# Patient Record
Sex: FEMALE | Race: WHITE | Employment: FULL TIME | URBAN - METROPOLITAN AREA
[De-identification: names, ages, dates, MRNs, and addresses within clinical notes are randomized per-mention and may not be internally consistent; named-entity substitution may affect disease eponyms.]

---

## 2017-07-27 PROBLEM — I10 ESSENTIAL HYPERTENSION: Status: ACTIVE | Noted: 2017-07-27

## 2018-09-14 ENCOUNTER — HOSPITAL ENCOUNTER (OUTPATIENT)
Dept: SLEEP CENTER | Age: 53
Discharge: HOME | End: 2018-09-14
Payer: COMMERCIAL

## 2018-09-14 DIAGNOSIS — G47.33 OSA (OBSTRUCTIVE SLEEP APNEA): Primary | ICD-10-CM

## 2018-09-14 PROCEDURE — 95810 POLYSOM 6/> YRS 4/> PARAM: CPT

## 2018-11-24 ENCOUNTER — HOSPITAL ENCOUNTER (OUTPATIENT)
Dept: SLEEP CENTER | Age: 53
Discharge: HOME | End: 2018-11-24
Payer: COMMERCIAL

## 2018-11-24 DIAGNOSIS — G47.33 OSA (OBSTRUCTIVE SLEEP APNEA): Primary | ICD-10-CM

## 2018-11-24 PROCEDURE — 95811 POLYSOM 6/>YRS CPAP 4/> PARM: CPT

## 2018-12-03 NOTE — PROGRESS NOTES
[address]                               SLEEP STUDY REPORT    PATIENT NAME: Chas Person                  :        1965  MED REC NO:   [id number]                            ROOM:  ACCOUNT NO:   [de-identified]                           ADMIT DATE: 2018  PROVIDER:     Santa Rose MD    DATE OF STUDY:  2018    ATTENDING:  Javed Fernandez MD    SLEEP SPECIALIST:  Santa Rose MD    INDICATION:  CPAP titration. SLEEP ARCHITECTURE:   minutes,  minutes, sleep efficiency  of 73% which is moderately decreased. SLEEP STAGES:  N1 5.5%, N2 50%, N3 22%, REM 21%. SLEEP LATENCY:  N1 0 minutes, N2 2.5 minutes, N3 54 minutes, .5  minutes. VENTILATION SUMMARY:  With an apnea plus hypopnea index of 3. PLMS SUMMARY WITH AROUSAL:  With an index of 0.8. OXYGENATION SUMMARY:  Mean 92%. HEART RATE SUMMARY:  Mean 66 beats per minute. No evidence of ramez or  tachyarrhythmias. CONCLUSION:  This titration was performed as per the [address] [place] [address]. The best results were obtained with  a CPAP system of 11 cm of water and an EPR of 3. The mask used was a  ResMed AirFit N20, size small. Heated humidity and ramping of the  pressure will improve compliance. In addition, of course, weight  control is essential and avoidance of sedatives, narcotics, hypnotics,  or any other substance that could the apnea recommended.     Thank you kindly,        Wilma Butler MD    D: 2018 10:24:08       T: 2018 3:34:38     [id number]  Job#: [id number]     Doc#: [id number]    CC:

## 2019-01-01 ENCOUNTER — HOSPITAL ENCOUNTER (EMERGENCY)
Age: 54
Discharge: HOME | End: 2019-01-01
Payer: COMMERCIAL

## 2019-01-01 VITALS
HEART RATE: 78 BPM | TEMPERATURE: 97.4 F | DIASTOLIC BLOOD PRESSURE: 89 MMHG | RESPIRATION RATE: 16 BRPM | SYSTOLIC BLOOD PRESSURE: 168 MMHG | OXYGEN SATURATION: 97 % | BODY MASS INDEX: 25.1 KG/M2 | WEIGHT: 170 LBS

## 2019-01-01 DIAGNOSIS — N30.01 ACUTE CYSTITIS WITH HEMATURIA: Primary | ICD-10-CM

## 2019-01-01 LAB
BACTERIA: ABNORMAL /HPF
BILIRUBIN URINE: NEGATIVE
BLOOD, URINE: ABNORMAL
CLARITY: CLEAR
COLOR: YELLOW
GLUCOSE URINE: NEGATIVE MG/DL
KETONES, URINE: NEGATIVE MG/DL
LEUKOCYTE ESTERASE, URINE: ABNORMAL
NITRITE, URINE: NEGATIVE
PH UA: 8.5 (ref 5–9)
PROTEIN UA: NEGATIVE MG/DL
RBC UA: ABNORMAL /HPF (ref 0–2)
SPECIFIC GRAVITY UA: 1.01 (ref 1–1.03)
UROBILINOGEN, URINE: 0.2 E.U./DL
WBC UA: ABNORMAL /HPF (ref 0–5)

## 2019-01-01 PROCEDURE — 81001 URINALYSIS AUTO W/SCOPE: CPT

## 2019-01-01 PROCEDURE — 87186 SC STD MICRODIL/AGAR DIL: CPT

## 2019-01-01 PROCEDURE — 87077 CULTURE AEROBIC IDENTIFY: CPT

## 2019-01-01 PROCEDURE — 87088 URINE BACTERIA CULTURE: CPT

## 2019-01-01 PROCEDURE — 99212 OFFICE O/P EST SF 10 MIN: CPT

## 2019-01-01 RX ORDER — CEPHALEXIN 500 MG/1
500 CAPSULE ORAL 3 TIMES DAILY
Qty: 21 CAPSULE | Refills: 0 | Status: SHIPPED | OUTPATIENT
Start: 2019-01-01 | End: 2019-01-08

## 2019-01-01 RX ORDER — PHENAZOPYRIDINE HYDROCHLORIDE 200 MG/1
200 TABLET, FILM COATED ORAL 3 TIMES DAILY PRN
Qty: 6 TABLET | Refills: 0 | Status: SHIPPED | OUTPATIENT
Start: 2019-01-01 | End: 2019-01-03

## 2019-01-04 LAB
ORGANISM: ABNORMAL
URINE CULTURE, ROUTINE: ABNORMAL
URINE CULTURE, ROUTINE: ABNORMAL

## 2019-07-11 ENCOUNTER — HOSPITAL ENCOUNTER (OUTPATIENT)
Age: 54
Discharge: HOME | End: 2019-07-11
Payer: COMMERCIAL

## 2019-07-11 PROCEDURE — 93005 ELECTROCARDIOGRAM TRACING: CPT | Performed by: INTERNAL MEDICINE

## 2019-07-12 LAB
EKG ATRIAL RATE: 74 BPM
EKG P AXIS: 54 DEGREES
EKG P-R INTERVAL: 174 MS
EKG Q-T INTERVAL: 414 MS
EKG QRS DURATION: 80 MS
EKG QTC CALCULATION (BAZETT): 459 MS
EKG R AXIS: 27 DEGREES
EKG T AXIS: 49 DEGREES
EKG VENTRICULAR RATE: 74 BPM

## 2022-06-24 DIAGNOSIS — Z00.00 WELL ADULT EXAM: ICD-10-CM

## 2022-06-24 DIAGNOSIS — E78.01 FAMILIAL HYPERCHOLESTEROLEMIA: ICD-10-CM

## 2022-06-24 DIAGNOSIS — D50.0 IRON DEFICIENCY ANEMIA SECONDARY TO BLOOD LOSS (CHRONIC): ICD-10-CM

## 2022-06-24 DIAGNOSIS — E03.9 PRIMARY HYPOTHYROIDISM: ICD-10-CM

## 2022-06-24 DIAGNOSIS — R73.9 HYPERGLYCEMIA: ICD-10-CM

## 2022-06-24 LAB
ALBUMIN SERPL-MCNC: 4.4 G/DL (ref 3.5–5.2)
ALP BLD-CCNC: 94 U/L (ref 35–104)
ALT SERPL-CCNC: 15 U/L (ref 0–32)
ANION GAP SERPL CALCULATED.3IONS-SCNC: 15 MMOL/L (ref 7–16)
AST SERPL-CCNC: 22 U/L (ref 0–31)
BILIRUB SERPL-MCNC: 1.5 MG/DL (ref 0–1.2)
BUN BLDV-MCNC: 12 MG/DL (ref 6–20)
CALCIUM SERPL-MCNC: 9.3 MG/DL (ref 8.6–10.2)
CHLORIDE BLD-SCNC: 101 MMOL/L (ref 98–107)
CHOLESTEROL, TOTAL: 145 MG/DL (ref 0–199)
CO2: 22 MMOL/L (ref 22–29)
CREAT SERPL-MCNC: 0.6 MG/DL (ref 0.5–1)
GFR AFRICAN AMERICAN: >60
GFR NON-AFRICAN AMERICAN: >60 ML/MIN/1.73
GLUCOSE BLD-MCNC: 84 MG/DL (ref 74–99)
HBA1C MFR BLD: 5.1 % (ref 4–5.6)
HCT VFR BLD CALC: 38.6 % (ref 34–48)
HDLC SERPL-MCNC: 81 MG/DL
HEMOGLOBIN: 12.6 G/DL (ref 11.5–15.5)
LDL CHOLESTEROL CALCULATED: 59 MG/DL (ref 0–99)
MCH RBC QN AUTO: 30.8 PG (ref 26–35)
MCHC RBC AUTO-ENTMCNC: 32.6 % (ref 32–34.5)
MCV RBC AUTO: 94.4 FL (ref 80–99.9)
PDW BLD-RTO: 13.8 FL (ref 11.5–15)
PLATELET # BLD: 327 E9/L (ref 130–450)
PMV BLD AUTO: 8.8 FL (ref 7–12)
POTASSIUM SERPL-SCNC: 4.2 MMOL/L (ref 3.5–5)
RBC # BLD: 4.09 E12/L (ref 3.5–5.5)
SODIUM BLD-SCNC: 138 MMOL/L (ref 132–146)
TOTAL PROTEIN: 7.2 G/DL (ref 6.4–8.3)
TRIGL SERPL-MCNC: 26 MG/DL (ref 0–149)
TSH SERPL DL<=0.05 MIU/L-ACNC: 2.69 UIU/ML (ref 0.27–4.2)
VLDLC SERPL CALC-MCNC: 5 MG/DL
WBC # BLD: 2.9 E9/L (ref 4.5–11.5)

## 2024-01-11 ENCOUNTER — HOSPITAL ENCOUNTER (EMERGENCY)
Age: 59
Discharge: HOME | End: 2024-01-11
Payer: COMMERCIAL

## 2024-01-11 ENCOUNTER — APPOINTMENT (OUTPATIENT)
Dept: GENERAL RADIOLOGY | Age: 59
End: 2024-01-11
Payer: COMMERCIAL

## 2024-01-11 VITALS
WEIGHT: 185 LBS | RESPIRATION RATE: 18 BRPM | OXYGEN SATURATION: 100 % | DIASTOLIC BLOOD PRESSURE: 90 MMHG | BODY MASS INDEX: 26.93 KG/M2 | HEART RATE: 81 BPM | SYSTOLIC BLOOD PRESSURE: 136 MMHG | TEMPERATURE: 98.7 F

## 2024-01-11 DIAGNOSIS — S46.912A LEFT SHOULDER STRAIN, INITIAL ENCOUNTER: ICD-10-CM

## 2024-01-11 DIAGNOSIS — T07.XXXA MULTIPLE ABRASIONS: ICD-10-CM

## 2024-01-11 DIAGNOSIS — S60.222A CONTUSION OF LEFT HAND, INITIAL ENCOUNTER: Primary | ICD-10-CM

## 2024-01-11 PROCEDURE — 73130 X-RAY EXAM OF HAND: CPT

## 2024-01-11 PROCEDURE — 73030 X-RAY EXAM OF SHOULDER: CPT

## 2024-01-11 PROCEDURE — 99211 OFF/OP EST MAY X REQ PHY/QHP: CPT

## 2024-01-11 ASSESSMENT — PAIN SCALES - GENERAL: PAINLEVEL_OUTOF10: 7

## 2024-01-11 ASSESSMENT — PAIN DESCRIPTION - ORIENTATION: ORIENTATION: LEFT

## 2024-01-11 ASSESSMENT — PAIN DESCRIPTION - DESCRIPTORS: DESCRIPTORS: ACHING;DISCOMFORT

## 2024-01-11 ASSESSMENT — PAIN DESCRIPTION - LOCATION: LOCATION: HAND;SHOULDER

## 2024-01-11 ASSESSMENT — PAIN - FUNCTIONAL ASSESSMENT: PAIN_FUNCTIONAL_ASSESSMENT: 0-10

## 2024-01-11 NOTE — ED PROVIDER NOTES
and left shoulder injury primarily.  X-ray of the left shoulder and left hand were negative for fracture or dislocation she does have some small abrasions.  Recommend symptomatic treatment.  May return back to work without restriction.  Follow-up with occupational health.  Instructions given.         DISCHARGE MEDICATIONS:  New Prescriptions    No medications on file       DISCONTINUED MEDICATIONS:  Discontinued Medications    No medications on file       PATIENT REFERRED TO:  [facility]  [address] [place] [address]  [phone]  Schedule an appointment as soon as possible for a visit         --------------------------------- ADDITIONAL PROVIDER NOTES ---------------------------------  I have spoken with the patient and discussed today’s results, in addition to providing specific details for the plan of care and counseling regarding the diagnosis and prognosis.  Their questions are answered at this time and they are agreeable with the plan.   This patient is stable for discharge.  I have shared the specific conditions for return, as well as the importance of follow-up.      * NOTE: (Please note that portions of this note were completed with a voice recognition program.  Efforts were made to edit the dictations but occasionally words are mis-transcribed.)    --------------------------------- IMPRESSION AND DISPOSITION ---------------------------------    IMPRESSION  1. Contusion of left hand, initial encounter    2. Left shoulder strain, initial encounter    3. Multiple abrasions        DISPOSITION Decision To Discharge 01/11/2024 11:10:05 AM    Disposition: Discharge to home  Patient condition is good    I am the Primary Clinician of Record.     Nithin Andrews PA-C (electronically signed) on 1/11/2024 at 11:10 AM         Nithin Andrews PA-C  01/11/24 111

## 2024-03-26 ENCOUNTER — OFFICE VISIT (OUTPATIENT)
Dept: ENT CLINIC | Age: 59
End: 2024-03-26
Payer: COMMERCIAL

## 2024-03-26 ENCOUNTER — PROCEDURE VISIT (OUTPATIENT)
Dept: AUDIOLOGY | Age: 59
End: 2024-03-26
Payer: COMMERCIAL

## 2024-03-26 VITALS
WEIGHT: 185 LBS | HEART RATE: 85 BPM | SYSTOLIC BLOOD PRESSURE: 149 MMHG | DIASTOLIC BLOOD PRESSURE: 95 MMHG | BODY MASS INDEX: 27.32 KG/M2

## 2024-03-26 DIAGNOSIS — H90.41 SENSORINEURAL HEARING LOSS, UNILATERAL, RIGHT EAR, WITH UNRESTRICTED HEARING ON THE CONTRALATERAL SIDE: Primary | ICD-10-CM

## 2024-03-26 DIAGNOSIS — H90.41 SENSORINEURAL HEARING LOSS (SNHL) OF RIGHT EAR WITH UNRESTRICTED HEARING OF LEFT EAR: Primary | ICD-10-CM

## 2024-03-26 PROCEDURE — G8419 CALC BMI OUT NRM PARAM NOF/U: HCPCS | Performed by: OTOLARYNGOLOGY

## 2024-03-26 PROCEDURE — 3017F COLORECTAL CA SCREEN DOC REV: CPT | Performed by: OTOLARYNGOLOGY

## 2024-03-26 PROCEDURE — 1036F TOBACCO NON-USER: CPT | Performed by: OTOLARYNGOLOGY

## 2024-03-26 PROCEDURE — 99203 OFFICE O/P NEW LOW 30 MIN: CPT | Performed by: OTOLARYNGOLOGY

## 2024-03-26 PROCEDURE — G8427 DOCREV CUR MEDS BY ELIG CLIN: HCPCS | Performed by: OTOLARYNGOLOGY

## 2024-03-26 PROCEDURE — 92567 TYMPANOMETRY: CPT | Performed by: AUDIOLOGIST

## 2024-03-26 PROCEDURE — 3077F SYST BP >= 140 MM HG: CPT | Performed by: OTOLARYNGOLOGY

## 2024-03-26 PROCEDURE — 92557 COMPREHENSIVE HEARING TEST: CPT | Performed by: AUDIOLOGIST

## 2024-03-26 PROCEDURE — G8484 FLU IMMUNIZE NO ADMIN: HCPCS | Performed by: OTOLARYNGOLOGY

## 2024-03-26 PROCEDURE — 3080F DIAST BP >= 90 MM HG: CPT | Performed by: OTOLARYNGOLOGY

## 2024-03-26 ASSESSMENT — ENCOUNTER SYMPTOMS
SINUS PAIN: 0
VOMITING: 0
SINUS PRESSURE: 0
VOICE CHANGE: 0
SHORTNESS OF BREATH: 0
TROUBLE SWALLOWING: 0
COUGH: 0
RHINORRHEA: 0

## 2024-03-26 NOTE — PROGRESS NOTES
[facility]  ELIAN MarshallO. Ms.Ed.  New Consult       Patient Name:  Natasha Washburn  :  1965     CHIEF C/O:    Chief Complaint   Patient presents with   • New Patient     PT STATES MID JANUARY SHE HAD FLUID IN RIGHT EAR ALONG WITH RINGING. PT STATES RINGING STILL COMES AND GOES.        HISTORY OBTAINED FROM:  patient    HISTORY OF PRESENT ILLNESS:       Natasha is a 59 y.o. year old female, here today for:       Hearing loss in right ear since child bal and has no vertigo increased changes in her hearing loss.  She she states that she has had known weakness in the right ear since childhood, as previously stated but has been noticing increased difficulties with in classroom hearing.  She is as 1/5  is having problems at work, she denies any other complaints of left-sided ear pain or hearing loss.  A full audiogram was conducted and reviewed with the patient today.  No difficulty swallowing no fever chills no shortness of breath or stridor no recent ear infections.         Past Medical History:   Diagnosis Date   • Anxiety    • Hypertension    • Nondependent alcohol abuse      Past Surgical History:   Procedure Laterality Date   • BACK SURGERY     • BREAST SURGERY         Current Outpatient Medications:   •  azelastine (ASTELIN) 0.1 % nasal spray, 1 spray by Nasal route 2 times daily Use in each nostril as directed, Disp: 60 mL, Rfl: 1  •  losartan (COZAAR) 50 MG tablet, , Disp: , Rfl:   •  amLODIPine (NORVASC) 5 MG tablet, Take 1 tablet by mouth daily, Disp: , Rfl:   •  estrogens conjugated (PREMARIN) 0.625 MG/GM CREA vaginal cream, Place 0.5 g vaginally daily, Disp: , Rfl:   •  citalopram (CELEXA) 10 MG tablet, Take 1 tablet by mouth daily, Disp: 90 tablet, Rfl: 1  •  citalopram (CELEXA) 20 MG tablet, Take 1 tablet by mouth daily, Disp: 90 tablet, Rfl: 1  Erythromycin and Sulfa antibiotics  Social History     Tobacco Use   • Smoking status: Never   • Smokeless tobacco:

## 2024-03-28 NOTE — PROGRESS NOTES
This patient was referred for audiometric and tympanometric testing by Dr. Atkins.  HISTORY OF PRESENT ILLNESS:       Natasha is a 59 y.o. year old female, here today for:       Hearing loss in right ear since child bal and has no vertigo increased changes in her hearing loss.  She she states that she has had known weakness in the right ear since childhood, as previously stated but has been noticing increased difficulties with in classroom hearing.  She is as 1/5  is having problems at work, she denies any other complaints of left-sided ear pain or hearing loss.  A full audiogram was conducted and reviewed with the patient today.  No difficulty swallowing no fever chills no shortness of breath or stridor no recent ear infections.       Audiometry using pure tone air and bone conduction testing revealed normal-to-borderline-normal hearing sensitivity, through the frequency range, left ear and a mild-to-moderate sensorineural hearing loss, right ear.  . Reliability was good. Speech reception thresholds were in good agreement with the pure tone averages, bilaterally. Speech discrimination scores were 88%, right ear at 65dBHL and 96%, left ear at 45dBHL.    Tympanometry revealed normal middle ear peak pressure and compliance, bilaterally.  Ipsilateral acoustic reflexes were present, right ear and absent, left ear at 1000Hz.    The results were reviewed with the patient.     Recommendations for follow up will be made pending physician consult.    Mario Esteban CCC/OLIVIA  Audiologist  [id number]  NPI#:  [id number]      Electronically signed by Maryjo Acevedo on 3/28/2024 at 3:32 PM

## 2024-07-01 DIAGNOSIS — E78.01 FAMILIAL HYPERCHOLESTEROLEMIA: ICD-10-CM

## 2024-07-01 DIAGNOSIS — D50.0 IRON DEFICIENCY ANEMIA SECONDARY TO BLOOD LOSS (CHRONIC): ICD-10-CM

## 2024-07-01 DIAGNOSIS — R73.9 HYPERGLYCEMIA: ICD-10-CM

## 2024-07-01 LAB
ALBUMIN: 4.5 G/DL (ref 3.5–5.2)
ALP BLD-CCNC: 79 U/L (ref 35–104)
ALT SERPL-CCNC: 14 U/L (ref 0–32)
ANION GAP SERPL CALCULATED.3IONS-SCNC: 16 MMOL/L (ref 7–16)
AST SERPL-CCNC: 30 U/L (ref 0–31)
BILIRUB SERPL-MCNC: 1 MG/DL (ref 0–1.2)
BILIRUB UR QL STRIP: NEGATIVE
BUN BLDV-MCNC: 15 MG/DL (ref 6–20)
CALCIUM SERPL-MCNC: 9.5 MG/DL (ref 8.6–10.2)
CHLORIDE BLD-SCNC: 102 MMOL/L (ref 98–107)
CHOLESTEROL, TOTAL: 161 MG/DL
CLARITY UR: CLEAR
CO2: 24 MMOL/L (ref 22–29)
COLOR UR: YELLOW
CREAT SERPL-MCNC: 0.7 MG/DL (ref 0.5–1)
CREAT UR-MCNC: 13.1 MG/DL (ref 29–226)
GFR, ESTIMATED: >90 ML/MIN/1.73M2
GLUCOSE BLD-MCNC: 89 MG/DL (ref 74–99)
GLUCOSE UR STRIP-MCNC: NEGATIVE MG/DL
HBA1C MFR BLD: 5.3 % (ref 4–5.6)
HCT VFR BLD CALC: 41.2 % (ref 34–48)
HDLC SERPL-MCNC: 82 MG/DL
HEMOGLOBIN: 13.2 G/DL (ref 11.5–15.5)
HGB UR QL STRIP.AUTO: NEGATIVE
KETONES UR STRIP-MCNC: NEGATIVE MG/DL
LDL CHOLESTEROL: 73 MG/DL
LEUKOCYTE ESTERASE UR QL STRIP: NEGATIVE
MCH RBC QN AUTO: 30.1 PG (ref 26–35)
MCHC RBC AUTO-ENTMCNC: 32 G/DL (ref 32–34.5)
MCV RBC AUTO: 94.1 FL (ref 80–99.9)
MICROALBUMIN UR-MCNC: <12 MG/L (ref 0–19)
MICROALBUMIN/CREAT UR-RTO: ABNORMAL MCG/MG CREAT (ref 0–30)
NITRITE UR QL STRIP: NEGATIVE
PDW BLD-RTO: 13.3 % (ref 11.5–15)
PH UR STRIP: 7 [PH] (ref 5–9)
PLATELET # BLD: 333 K/UL (ref 130–450)
PMV BLD AUTO: 9 FL (ref 7–12)
POTASSIUM SERPL-SCNC: 4.6 MMOL/L (ref 3.5–5)
PROT UR STRIP-MCNC: NEGATIVE MG/DL
RBC # BLD: 4.38 M/UL (ref 3.5–5.5)
RBC #/AREA URNS HPF: ABNORMAL /HPF
SODIUM BLD-SCNC: 142 MMOL/L (ref 132–146)
SP GR UR STRIP: <1.005 (ref 1–1.03)
TOTAL PROTEIN: 7.4 G/DL (ref 6.4–8.3)
TRIGL SERPL-MCNC: 30 MG/DL
UROBILINOGEN UR STRIP-ACNC: 0.2 EU/DL (ref 0–1)
VLDLC SERPL CALC-MCNC: 6 MG/DL
WBC # BLD: 3.7 K/UL (ref 4.5–11.5)
WBC #/AREA URNS HPF: ABNORMAL /HPF

## 2024-07-03 ENCOUNTER — TRANSCRIBE ORDERS (OUTPATIENT)
Dept: ADMINISTRATIVE | Age: 59
End: 2024-07-03

## 2024-07-03 DIAGNOSIS — N28.1 ACQUIRED CYST OF KIDNEY: Primary | ICD-10-CM

## 2024-11-19 ENCOUNTER — APPOINTMENT (OUTPATIENT)
Dept: GENERAL RADIOLOGY | Age: 59
DRG: 481 | End: 2024-11-19
Payer: COMMERCIAL

## 2024-11-19 ENCOUNTER — HOSPITAL ENCOUNTER (INPATIENT)
Age: 59
LOS: 4 days | Discharge: HOME HEALTH | DRG: 481 | End: 2024-11-23
Attending: EMERGENCY MEDICINE | Admitting: INTERNAL MEDICINE
Payer: COMMERCIAL

## 2024-11-19 DIAGNOSIS — S72.001A CLOSED FRACTURE OF RIGHT HIP, INITIAL ENCOUNTER (HCC): Primary | ICD-10-CM

## 2024-11-19 DIAGNOSIS — S72.142A INTERTROCHANTERIC FRACTURE OF LEFT HIP, CLOSED, INITIAL ENCOUNTER (HCC): ICD-10-CM

## 2024-11-19 LAB
ABO + RH BLD: NORMAL
ALBUMIN SERPL-MCNC: 4.1 G/DL (ref 3.5–5.2)
ALP SERPL-CCNC: 75 U/L (ref 35–104)
ALT SERPL-CCNC: 20 U/L (ref 0–32)
ANION GAP SERPL CALCULATED.3IONS-SCNC: 12 MMOL/L (ref 7–16)
ARM BAND NUMBER: NORMAL
AST SERPL-CCNC: 28 U/L (ref 0–31)
BASOPHILS # BLD: 0 K/UL (ref 0–0.2)
BASOPHILS NFR BLD: 0 % (ref 0–2)
BILIRUB SERPL-MCNC: 1.4 MG/DL (ref 0–1.2)
BLOOD BANK SAMPLE EXPIRATION: NORMAL
BLOOD GROUP ANTIBODIES SERPL: NEGATIVE
BUN SERPL-MCNC: 11 MG/DL (ref 6–20)
CALCIUM SERPL-MCNC: 8.8 MG/DL (ref 8.6–10.2)
CHLORIDE SERPL-SCNC: 98 MMOL/L (ref 98–107)
CO2 SERPL-SCNC: 25 MMOL/L (ref 22–29)
CREAT SERPL-MCNC: 0.5 MG/DL (ref 0.5–1)
EOSINOPHIL # BLD: 0 K/UL (ref 0.05–0.5)
EOSINOPHILS RELATIVE PERCENT: 0 % (ref 0–6)
ERYTHROCYTE [DISTWIDTH] IN BLOOD BY AUTOMATED COUNT: 13.1 % (ref 11.5–15)
GFR, ESTIMATED: >90 ML/MIN/1.73M2
GLUCOSE SERPL-MCNC: 168 MG/DL (ref 74–99)
HCT VFR BLD AUTO: 35.5 % (ref 34–48)
HGB BLD-MCNC: 12.1 G/DL (ref 11.5–15.5)
LYMPHOCYTES NFR BLD: 0.46 K/UL (ref 1.5–4)
LYMPHOCYTES RELATIVE PERCENT: 7 % (ref 20–42)
MCH RBC QN AUTO: 31.3 PG (ref 26–35)
MCHC RBC AUTO-ENTMCNC: 34.1 G/DL (ref 32–34.5)
MCV RBC AUTO: 91.7 FL (ref 80–99.9)
MONOCYTES NFR BLD: 0.39 K/UL (ref 0.1–0.95)
MONOCYTES NFR BLD: 6 % (ref 2–12)
NEUTROPHILS NFR BLD: 87 % (ref 43–80)
NEUTS SEG NFR BLD: 5.66 K/UL (ref 1.8–7.3)
PLATELET # BLD AUTO: 295 K/UL (ref 130–450)
PMV BLD AUTO: 9 FL (ref 7–12)
POTASSIUM SERPL-SCNC: 3.7 MMOL/L (ref 3.5–5)
PROT SERPL-MCNC: 6.6 G/DL (ref 6.4–8.3)
RBC # BLD AUTO: 3.87 M/UL (ref 3.5–5.5)
RBC # BLD: ABNORMAL 10*6/UL
RBC # BLD: ABNORMAL 10*6/UL
SODIUM SERPL-SCNC: 135 MMOL/L (ref 132–146)
WBC OTHER # BLD: 6.5 K/UL (ref 4.5–11.5)

## 2024-11-19 PROCEDURE — 36415 COLL VENOUS BLD VENIPUNCTURE: CPT

## 2024-11-19 PROCEDURE — 86901 BLOOD TYPING SEROLOGIC RH(D): CPT

## 2024-11-19 PROCEDURE — 73552 X-RAY EXAM OF FEMUR 2/>: CPT

## 2024-11-19 PROCEDURE — 86850 RBC ANTIBODY SCREEN: CPT

## 2024-11-19 PROCEDURE — 71045 X-RAY EXAM CHEST 1 VIEW: CPT

## 2024-11-19 PROCEDURE — 1200000000 HC SEMI PRIVATE

## 2024-11-19 PROCEDURE — 6370000000 HC RX 637 (ALT 250 FOR IP): Performed by: INTERNAL MEDICINE

## 2024-11-19 PROCEDURE — 6360000002 HC RX W HCPCS: Performed by: EMERGENCY MEDICINE

## 2024-11-19 PROCEDURE — 99285 EMERGENCY DEPT VISIT HI MDM: CPT

## 2024-11-19 PROCEDURE — 86900 BLOOD TYPING SEROLOGIC ABO: CPT

## 2024-11-19 PROCEDURE — 6360000002 HC RX W HCPCS: Performed by: INTERNAL MEDICINE

## 2024-11-19 PROCEDURE — 80053 COMPREHEN METABOLIC PANEL: CPT

## 2024-11-19 PROCEDURE — 96376 TX/PRO/DX INJ SAME DRUG ADON: CPT

## 2024-11-19 PROCEDURE — 82306 VITAMIN D 25 HYDROXY: CPT

## 2024-11-19 PROCEDURE — 73501 X-RAY EXAM HIP UNI 1 VIEW: CPT

## 2024-11-19 PROCEDURE — 96374 THER/PROPH/DIAG INJ IV PUSH: CPT

## 2024-11-19 PROCEDURE — 73502 X-RAY EXAM HIP UNI 2-3 VIEWS: CPT

## 2024-11-19 PROCEDURE — 85025 COMPLETE CBC W/AUTO DIFF WBC: CPT

## 2024-11-19 RX ORDER — LOSARTAN POTASSIUM 50 MG/1
50 TABLET ORAL DAILY
Status: DISCONTINUED | OUTPATIENT
Start: 2024-11-20 | End: 2024-11-23 | Stop reason: HOSPADM

## 2024-11-19 RX ORDER — ACETAMINOPHEN 500 MG
500 TABLET ORAL EVERY 6 HOURS PRN
Status: DISCONTINUED | OUTPATIENT
Start: 2024-11-19 | End: 2024-11-23 | Stop reason: HOSPADM

## 2024-11-19 RX ORDER — AMLODIPINE BESYLATE 5 MG/1
5 TABLET ORAL DAILY
Status: DISCONTINUED | OUTPATIENT
Start: 2024-11-20 | End: 2024-11-23 | Stop reason: HOSPADM

## 2024-11-19 RX ORDER — TRANEXAMIC ACID 10 MG/ML
1000 INJECTION, SOLUTION INTRAVENOUS
Status: ACTIVE | OUTPATIENT
Start: 2024-11-20 | End: 2024-11-20

## 2024-11-19 RX ORDER — ENOXAPARIN SODIUM 100 MG/ML
40 INJECTION SUBCUTANEOUS DAILY
Status: DISCONTINUED | OUTPATIENT
Start: 2024-11-20 | End: 2024-11-23 | Stop reason: HOSPADM

## 2024-11-19 RX ORDER — OXYCODONE AND ACETAMINOPHEN 5; 325 MG/1; MG/1
1 TABLET ORAL EVERY 4 HOURS PRN
Status: DISCONTINUED | OUTPATIENT
Start: 2024-11-19 | End: 2024-11-23 | Stop reason: HOSPADM

## 2024-11-19 RX ORDER — FENTANYL CITRATE 50 UG/ML
100 INJECTION, SOLUTION INTRAMUSCULAR; INTRAVENOUS
Status: DISCONTINUED | OUTPATIENT
Start: 2024-11-19 | End: 2024-11-19

## 2024-11-19 RX ORDER — POLYETHYLENE GLYCOL 3350 17 G/17G
17 POWDER, FOR SOLUTION ORAL DAILY PRN
Status: DISCONTINUED | OUTPATIENT
Start: 2024-11-19 | End: 2024-11-23 | Stop reason: HOSPADM

## 2024-11-19 RX ORDER — CITALOPRAM HYDROBROMIDE 10 MG/1
30 TABLET ORAL DAILY
Status: DISCONTINUED | OUTPATIENT
Start: 2024-11-20 | End: 2024-11-23 | Stop reason: HOSPADM

## 2024-11-19 RX ORDER — FENTANYL CITRATE 0.05 MG/ML
50 INJECTION, SOLUTION INTRAMUSCULAR; INTRAVENOUS EVERY 4 HOURS PRN
Status: DISCONTINUED | OUTPATIENT
Start: 2024-11-19 | End: 2024-11-23 | Stop reason: HOSPADM

## 2024-11-19 RX ORDER — FENTANYL CITRATE 50 UG/ML
100 INJECTION, SOLUTION INTRAMUSCULAR; INTRAVENOUS
Status: DISCONTINUED | OUTPATIENT
Start: 2024-11-19 | End: 2024-11-21 | Stop reason: SDUPTHER

## 2024-11-19 RX ORDER — PROCHLORPERAZINE EDISYLATE 5 MG/ML
10 INJECTION INTRAMUSCULAR; INTRAVENOUS EVERY 6 HOURS PRN
Status: DISCONTINUED | OUTPATIENT
Start: 2024-11-19 | End: 2024-11-23 | Stop reason: HOSPADM

## 2024-11-19 RX ORDER — CITALOPRAM HYDROBROMIDE 20 MG/1
20 TABLET ORAL DAILY
Status: DISCONTINUED | OUTPATIENT
Start: 2024-11-19 | End: 2024-11-19 | Stop reason: SDUPTHER

## 2024-11-19 RX ADMIN — FENTANYL CITRATE 50 MCG: 0.05 INJECTION, SOLUTION INTRAMUSCULAR; INTRAVENOUS at 23:37

## 2024-11-19 RX ADMIN — OXYCODONE HYDROCHLORIDE AND ACETAMINOPHEN 1 TABLET: 5; 325 TABLET ORAL at 21:49

## 2024-11-19 RX ADMIN — FENTANYL CITRATE 100 MCG: 50 INJECTION INTRAMUSCULAR; INTRAVENOUS at 14:35

## 2024-11-19 RX ADMIN — FENTANYL CITRATE 100 MCG: 50 INJECTION INTRAMUSCULAR; INTRAVENOUS at 11:10

## 2024-11-19 RX ADMIN — OXYCODONE HYDROCHLORIDE AND ACETAMINOPHEN 1 TABLET: 5; 325 TABLET ORAL at 17:39

## 2024-11-19 RX ADMIN — FENTANYL CITRATE 50 MCG: 0.05 INJECTION, SOLUTION INTRAMUSCULAR; INTRAVENOUS at 18:52

## 2024-11-19 ASSESSMENT — ENCOUNTER SYMPTOMS
ABDOMINAL PAIN: 0
SHORTNESS OF BREATH: 0
CHEST TIGHTNESS: 0

## 2024-11-19 ASSESSMENT — PAIN SCALES - GENERAL
PAINLEVEL_OUTOF10: 4
PAINLEVEL_OUTOF10: 8
PAINLEVEL_OUTOF10: 7
PAINLEVEL_OUTOF10: 7
PAINLEVEL_OUTOF10: 8
PAINLEVEL_OUTOF10: 8

## 2024-11-19 ASSESSMENT — PAIN DESCRIPTION - LOCATION
LOCATION: HIP

## 2024-11-19 ASSESSMENT — PAIN DESCRIPTION - ORIENTATION
ORIENTATION: LEFT
ORIENTATION: RIGHT
ORIENTATION: RIGHT
ORIENTATION: LEFT

## 2024-11-19 ASSESSMENT — LIFESTYLE VARIABLES
HOW OFTEN DO YOU HAVE A DRINK CONTAINING ALCOHOL: NEVER
HOW OFTEN DO YOU HAVE A DRINK CONTAINING ALCOHOL: 2-4 TIMES A MONTH
HOW MANY STANDARD DRINKS CONTAINING ALCOHOL DO YOU HAVE ON A TYPICAL DAY: 1 OR 2

## 2024-11-19 ASSESSMENT — PAIN DESCRIPTION - DESCRIPTORS
DESCRIPTORS: JABBING;NAGGING
DESCRIPTORS: ACHING;DISCOMFORT;THROBBING
DESCRIPTORS: ACHING;DISCOMFORT;THROBBING

## 2024-11-19 ASSESSMENT — PAIN SCALES - WONG BAKER: WONGBAKER_NUMERICALRESPONSE: HURTS A LITTLE BIT

## 2024-11-19 NOTE — CONSULTS
Department of Orthopedic Surgery  Resident Consult Note          Reason for Consult: Right hip pain after fall    HISTORY OF PRESENT ILLNESS:       Patient is a 59 y.o. 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  She fell directly on her left hip, felt immediate pain and was unable to stand or walk afterwards.  She denies any previous injuries to her right hip.  She is active and independent at baseline, states she walks about 3 miles daily.  Did have a left proximal humerus fracture about 3 years ago that was treated surgically.  She denies numbness/tingling/paresthesias to the right lower extremity.  She does not take any blood thinners, is overall medically healthy.  She denies previous pain in her right hip prior to fall.  Denies fevers or chills. Denies any other orthopedic complaints at this time.      Past Medical History:        Diagnosis Date    Anxiety     Hypertension     Nondependent alcohol abuse      Past Surgical History:        Procedure Laterality Date    BACK SURGERY      BREAST SURGERY       Current Medications:   Current Facility-Administered Medications: fentaNYL (SUBLIMAZE) injection 100 mcg, 100 mcg, IntraVENous, Q2H PRN  Allergies:  Erythromycin and Sulfa antibiotics    Social History:   TOBACCO:   reports that she has never smoked. She has never used smokeless tobacco.  ETOH:   has no history on file for alcohol use.  DRUGS:   reports no history of drug use.  ACTIVITIES OF DAILY LIVING: Independent  OCCUPATION:   Family History:   No family history on file.    REVIEW OF SYSTEMS:  CONSTITUTIONAL:  negative for  fevers, chills  EYES:  negative for blurred vision, visual disturbance  HEENT:  negative for  hearing loss, voice change  RESPIRATORY:  negative for  dyspnea, wheezing  CARDIOVASCULAR:  negative for  chest pain, palpitations  GASTROINTESTINAL:  negative for nausea, vomiting  GENITOURINARY:  negative for frequency,

## 2024-11-19 NOTE — PLAN OF CARE
Problem: Discharge Planning  Goal: Discharge to home or other facility with appropriate resources  Outcome: Progressing  Flowsheets (Taken 11/19/2024 8960)  Discharge to home or other facility with appropriate resources:   Identify barriers to discharge with patient and caregiver   Arrange for interpreters to assist at discharge as needed   Arrange for needed discharge resources and transportation as appropriate   Refer to discharge planning if patient needs post-hospital services based on physician order or complex needs related to functional status, cognitive ability or social support system   Identify discharge learning needs (meds, wound care, etc)     Problem: Safety - Adult  Goal: Free from fall injury  Outcome: Progressing     Problem: ABCDS Injury Assessment  Goal: Absence of physical injury  Outcome: Progressing

## 2024-11-19 NOTE — ED PROVIDER NOTES
59-year-old female presenting from work.  She was at the school, she is a teacher.  She went to go backwards but the desks were in her way and she fell striking her right hip.  Pain to the right hip, hard time moving around.  Upon arrival she is not moving the leg, and describes pain at the right hip specifically.  No blood thinning medications         No family history on file.  Past Surgical History:   Procedure Laterality Date    BACK SURGERY      BREAST SURGERY         Review of Systems   Constitutional:  Negative for chills and fever.   Respiratory:  Negative for chest tightness and shortness of breath.    Cardiovascular:  Negative for chest pain.   Gastrointestinal:  Negative for abdominal pain.   Musculoskeletal:         Left hip pain          Physical Exam  Constitutional:       General: She is not in acute distress.     Appearance: She is well-developed.   HENT:      Head: Normocephalic and atraumatic.   Eyes:      Conjunctiva/sclera: Conjunctivae normal.      Pupils: Pupils are equal, round, and reactive to light.   Neck:      Thyroid: No thyromegaly.   Cardiovascular:      Rate and Rhythm: Normal rate and regular rhythm.   Pulmonary:      Effort: Pulmonary effort is normal. No respiratory distress.      Breath sounds: Normal breath sounds.   Abdominal:      General: There is no distension.      Palpations: Abdomen is soft.      Tenderness: There is no abdominal tenderness. There is no guarding or rebound.   Musculoskeletal:         General: Tenderness present.      Cervical back: Normal range of motion.      Comments: Tenderness with any movement of the left leg    No knee tenderness or ankle tenderness   Skin:     General: Skin is warm and dry.      Findings: No erythema.   Neurological:      Mental Status: She is alert and oriented to person, place, and time.      Cranial Nerves: No cranial nerve deficit.      Coordination: Coordination normal.          Procedures     MDM       History From:  right   femur with superior displacement of the distal fracture fragment.   2. Underlying chronic changes seen throughout the lung fields bilaterally   with no acute cardiopulmonary disease.         XR HIP RIGHT (1 VIEW)    (Results Pending)       ------------------------- NURSING NOTES AND VITALS REVIEWED ---------------------------  Date / Time Roomed:  11/19/2024 10:50 AM  ED Bed Assignment:  [id number]/H6    The nursing notes within the ED encounter and vital signs as below have been reviewed.     Patient Vitals for the past 24 hrs:   BP Temp Pulse Resp SpO2   11/19/24 1047 (!) 150/91 97.5 °F (36.4 °C) 88 18 94 %       Oxygen Saturation Interpretation: Normal    ------------------------------------------ PROGRESS NOTES ------------------------------------------  Re-evaluation(s):  Patient’s symptoms show no change  Repeat physical examination is not changed    Counseling:  I have spoken with the patient and discussed today’s results, in addition to providing specific details for the plan of care and counseling regarding the diagnosis and prognosis.  Their questions are answered at this time and they are agreeable with the plan of admission.    --------------------------------- ADDITIONAL PROVIDER NOTES ---------------------------------  Consultations:   Spoke with Dr. Dunne.  Discussed case.  They will admit the patient.  This patient's ED course included: a personal history and physicial examination, re-evaluation prior to disposition, multiple bedside re-evaluations, and IV medications    This patient has remained hemodynamically stable during their ED course.    Diagnosis:  1. Closed fracture of right hip, initial encounter ([facility])        Disposition:  Patient's disposition: Admit to med/surg floor  Patient's condition is stable                 Vignesh Rivera DO  11/19/24 9280

## 2024-11-19 NOTE — H&P
Department of Internal Medicine        CHIEF COMPLAINT:  R hip pain    Reason for Admission:  R hip fracture    HISTORY OF PRESENT ILLNESS:      The patient is a 59 y.o. female who presents with right hip pain.  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  Patient had immediate pain.  Patient was brought to the ED and x-ray showed a comminuted anterior trochanteric/proximal femur shaft fracture.  Patient sisters at the bedside and case discussed routine lab work was unremarkable but had a TSH 4.99 today.  Temperature was 98.3 with heart rate of 74 blood pressure 122/74.  O2 sat 97% on room air at rest.  Patient denies any history of coronary artery disease/CHF.  She denies history of chest pain, palpitation or syncope.    Past Medical History:    Past Medical History:   Diagnosis Date    Anxiety     Hypertension     Nondependent alcohol abuse      Past Surgical History:    Past Surgical History:   Procedure Laterality Date    BACK SURGERY      BREAST SURGERY         Medications Prior to Admission:    @  Prior to Admission medications    Medication Sig Start Date End Date Taking? Authorizing Provider   Misc. Devices (CPAP MACHINE) MISC by Does not apply route continuous 10   Yes Mani Johnson MD   citalopram (CELEXA) 20 MG tablet Take 1 tablet by mouth daily 7/1/24  Yes Landon Cortés DO   citalopram (CELEXA) 10 MG tablet Take 1 tablet by mouth daily 7/1/24  Yes Landon Cortés DO   losartan (COZAAR) 50 MG tablet Take 1 tablet by mouth daily 1/4/24  Yes Mani Johnson MD   amLODIPine (NORVASC) 5 MG tablet Take 1 tablet by mouth daily 11/11/23  Yes Mani Johnson MD   estrogens conjugated (PREMARIN) 0.625 MG/GM CREA vaginal cream Place 0.5 g vaginally Twice a Week    Mani Johnson MD       Allergies:  Erythromycin and Sulfa antibiotics    Social History:   Social History     Socioeconomic History    Marital status:       EXAM:    Vitals:  BP (!) 150/91   Pulse 88   Temp 97.5 °F (36.4 °C)   Resp 18   SpO2 94%     General:  This is a 59 y.o. yo female who is alert and oriented in NAD  HEENT:  Head is normocephalic and atraumatic, PERRLA, EOMI, mucus membranes moist with no pharyngeal erythema or exudate.  Neck:  Supple with no carotid bruits, JVD or thyromegaly.  No cervical adenopathy  CV:  Regular rate and rhythm, no murmurs  Lungs:  Clear to auscultation bilaterally with no wheezes, rales or rhonchi  Abdomen:  Soft, nontender, nondistended, bowel sounds present  Extremities:  + R hip pain, ecchymosis, edema, peripheral pulses intact bilaterally  Neuro:  Cranial nerves II-XII grossly intact; motor and sensory function intact with no focal deficits  Skin:  No rashes, lesions or wounds    DATA:  CBC with Differential:    Lab Results   Component Value Date/Time    WBC 3.7 07/01/2024 10:08 AM    RBC 4.38 07/01/2024 10:08 AM    HGB 13.2 07/01/2024 10:08 AM    HCT 41.2 07/01/2024 10:08 AM     07/01/2024 10:08 AM    MCV 94.1 07/01/2024 10:08 AM    MCH 30.1 07/01/2024 10:08 AM    MCHC 32.0 07/01/2024 10:08 AM    RDW 13.3 07/01/2024 10:08 AM     CMP:    Lab Results   Component Value Date/Time     07/01/2024 10:08 AM    K 4.6 07/01/2024 10:08 AM     07/01/2024 10:08 AM    CO2 24 07/01/2024 10:08 AM    BUN 15 07/01/2024 10:08 AM    CREATININE 0.7 07/01/2024 10:08 AM    GFRAA >60 06/24/2022 09:10 AM    LABGLOM >90 07/01/2024 10:08 AM    LABGLOM >60 08/28/2023 04:24 PM    GLUCOSE 89 07/01/2024 10:08 AM    GLUCOSE 93 05/11/2011 11:02 AM    CALCIUM 9.5 07/01/2024 10:08 AM    BILITOT 1.0 07/01/2024 10:08 AM    ALKPHOS 79 07/01/2024 10:08 AM    AST 30 07/01/2024 10:08 AM    ALT 14 07/01/2024 10:08 AM     Magnesium:  No results found for: \"MG\"  Phosphorus:  No results found for: \"PHOS\"  PT/INR:  No results found for: \"PROTIME\", \"INR\"  Troponin:  No results found for: \"TROPONINI\"  U/A:    Lab Results   Component Value Date/Time

## 2024-11-20 ENCOUNTER — APPOINTMENT (OUTPATIENT)
Dept: GENERAL RADIOLOGY | Age: 59
DRG: 481 | End: 2024-11-20
Payer: COMMERCIAL

## 2024-11-20 ENCOUNTER — ANESTHESIA EVENT (OUTPATIENT)
Dept: OPERATING ROOM | Age: 59
End: 2024-11-20
Payer: COMMERCIAL

## 2024-11-20 ENCOUNTER — ANESTHESIA (OUTPATIENT)
Dept: OPERATING ROOM | Age: 59
End: 2024-11-20
Payer: COMMERCIAL

## 2024-11-20 LAB
25(OH)D3 SERPL-MCNC: 58.8 NG/ML (ref 30–100)
ALBUMIN SERPL-MCNC: 3.8 G/DL (ref 3.5–5.2)
ALP SERPL-CCNC: 69 U/L (ref 35–104)
ALT SERPL-CCNC: 18 U/L (ref 0–32)
ANION GAP SERPL CALCULATED.3IONS-SCNC: 9 MMOL/L (ref 7–16)
AST SERPL-CCNC: 23 U/L (ref 0–31)
BASOPHILS # BLD: 0.03 K/UL (ref 0–0.2)
BASOPHILS NFR BLD: 1 % (ref 0–2)
BILIRUB SERPL-MCNC: 1.8 MG/DL (ref 0–1.2)
BUN SERPL-MCNC: 14 MG/DL (ref 6–20)
CALCIUM SERPL-MCNC: 8.6 MG/DL (ref 8.6–10.2)
CHLORIDE SERPL-SCNC: 96 MMOL/L (ref 98–107)
CHOLEST SERPL-MCNC: 118 MG/DL
CO2 SERPL-SCNC: 27 MMOL/L (ref 22–29)
CREAT SERPL-MCNC: 0.6 MG/DL (ref 0.5–1)
EOSINOPHIL # BLD: 0.1 K/UL (ref 0.05–0.5)
EOSINOPHILS RELATIVE PERCENT: 2 % (ref 0–6)
ERYTHROCYTE [DISTWIDTH] IN BLOOD BY AUTOMATED COUNT: 13.2 % (ref 11.5–15)
GFR, ESTIMATED: >90 ML/MIN/1.73M2
GLUCOSE SERPL-MCNC: 115 MG/DL (ref 74–99)
HCT VFR BLD AUTO: 32 % (ref 34–48)
HDLC SERPL-MCNC: 75 MG/DL
HGB BLD-MCNC: 11.1 G/DL (ref 11.5–15.5)
IMM GRANULOCYTES # BLD AUTO: <0.03 K/UL (ref 0–0.58)
IMM GRANULOCYTES NFR BLD: 0 % (ref 0–5)
LDLC SERPL CALC-MCNC: 38 MG/DL
LYMPHOCYTES NFR BLD: 1.04 K/UL (ref 1.5–4)
LYMPHOCYTES RELATIVE PERCENT: 21 % (ref 20–42)
MAGNESIUM SERPL-MCNC: 2 MG/DL (ref 1.6–2.6)
MCH RBC QN AUTO: 31.4 PG (ref 26–35)
MCHC RBC AUTO-ENTMCNC: 34.7 G/DL (ref 32–34.5)
MCV RBC AUTO: 90.4 FL (ref 80–99.9)
MONOCYTES NFR BLD: 0.38 K/UL (ref 0.1–0.95)
MONOCYTES NFR BLD: 8 % (ref 2–12)
NEUTROPHILS NFR BLD: 69 % (ref 43–80)
NEUTS SEG NFR BLD: 3.43 K/UL (ref 1.8–7.3)
PHOSPHATE SERPL-MCNC: 3.7 MG/DL (ref 2.5–4.5)
PLATELET # BLD AUTO: 249 K/UL (ref 130–450)
PMV BLD AUTO: 8.5 FL (ref 7–12)
POTASSIUM SERPL-SCNC: 4.1 MMOL/L (ref 3.5–5)
PROT SERPL-MCNC: 6.1 G/DL (ref 6.4–8.3)
RBC # BLD AUTO: 3.54 M/UL (ref 3.5–5.5)
SODIUM SERPL-SCNC: 132 MMOL/L (ref 132–146)
T4 FREE SERPL-MCNC: 1.3 NG/DL (ref 0.9–1.7)
TRIGL SERPL-MCNC: 25 MG/DL
TSH SERPL DL<=0.05 MIU/L-ACNC: 4.99 UIU/ML (ref 0.27–4.2)
VLDLC SERPL CALC-MCNC: 5 MG/DL
WBC OTHER # BLD: 5 K/UL (ref 4.5–11.5)

## 2024-11-20 PROCEDURE — 2720000010 HC SURG SUPPLY STERILE: Performed by: STUDENT IN AN ORGANIZED HEALTH CARE EDUCATION/TRAINING PROGRAM

## 2024-11-20 PROCEDURE — 6360000002 HC RX W HCPCS: Performed by: NURSE ANESTHETIST, CERTIFIED REGISTERED

## 2024-11-20 PROCEDURE — 84443 ASSAY THYROID STIM HORMONE: CPT

## 2024-11-20 PROCEDURE — 83735 ASSAY OF MAGNESIUM: CPT

## 2024-11-20 PROCEDURE — 6360000002 HC RX W HCPCS: Performed by: ORTHOPAEDIC SURGERY

## 2024-11-20 PROCEDURE — 36415 COLL VENOUS BLD VENIPUNCTURE: CPT

## 2024-11-20 PROCEDURE — 80061 LIPID PANEL: CPT

## 2024-11-20 PROCEDURE — C1713 ANCHOR/SCREW BN/BN,TIS/BN: HCPCS | Performed by: STUDENT IN AN ORGANIZED HEALTH CARE EDUCATION/TRAINING PROGRAM

## 2024-11-20 PROCEDURE — 6360000002 HC RX W HCPCS

## 2024-11-20 PROCEDURE — 3600000004 HC SURGERY LEVEL 4 BASE: Performed by: STUDENT IN AN ORGANIZED HEALTH CARE EDUCATION/TRAINING PROGRAM

## 2024-11-20 PROCEDURE — 93005 ELECTROCARDIOGRAM TRACING: CPT | Performed by: INTERNAL MEDICINE

## 2024-11-20 PROCEDURE — 6370000000 HC RX 637 (ALT 250 FOR IP): Performed by: INTERNAL MEDICINE

## 2024-11-20 PROCEDURE — 2580000003 HC RX 258: Performed by: NURSE ANESTHETIST, CERTIFIED REGISTERED

## 2024-11-20 PROCEDURE — 3700000000 HC ANESTHESIA ATTENDED CARE: Performed by: STUDENT IN AN ORGANIZED HEALTH CARE EDUCATION/TRAINING PROGRAM

## 2024-11-20 PROCEDURE — 6370000000 HC RX 637 (ALT 250 FOR IP): Performed by: ORTHOPAEDIC SURGERY

## 2024-11-20 PROCEDURE — 85025 COMPLETE CBC W/AUTO DIFF WBC: CPT

## 2024-11-20 PROCEDURE — 3600000014 HC SURGERY LEVEL 4 ADDTL 15MIN: Performed by: STUDENT IN AN ORGANIZED HEALTH CARE EDUCATION/TRAINING PROGRAM

## 2024-11-20 PROCEDURE — 84439 ASSAY OF FREE THYROXINE: CPT

## 2024-11-20 PROCEDURE — 2580000003 HC RX 258: Performed by: ORTHOPAEDIC SURGERY

## 2024-11-20 PROCEDURE — C1769 GUIDE WIRE: HCPCS | Performed by: STUDENT IN AN ORGANIZED HEALTH CARE EDUCATION/TRAINING PROGRAM

## 2024-11-20 PROCEDURE — 7100000000 HC PACU RECOVERY - FIRST 15 MIN: Performed by: STUDENT IN AN ORGANIZED HEALTH CARE EDUCATION/TRAINING PROGRAM

## 2024-11-20 PROCEDURE — 7100000001 HC PACU RECOVERY - ADDTL 15 MIN: Performed by: STUDENT IN AN ORGANIZED HEALTH CARE EDUCATION/TRAINING PROGRAM

## 2024-11-20 PROCEDURE — 73552 X-RAY EXAM OF FEMUR 2/>: CPT

## 2024-11-20 PROCEDURE — 6360000002 HC RX W HCPCS: Performed by: INTERNAL MEDICINE

## 2024-11-20 PROCEDURE — 2500000003 HC RX 250 WO HCPCS: Performed by: NURSE ANESTHETIST, CERTIFIED REGISTERED

## 2024-11-20 PROCEDURE — 1200000000 HC SEMI PRIVATE

## 2024-11-20 PROCEDURE — 6360000002 HC RX W HCPCS: Performed by: ANESTHESIOLOGY

## 2024-11-20 PROCEDURE — 0QS606Z REPOSITION RIGHT UPPER FEMUR WITH INTRAMEDULLARY INTERNAL FIXATION DEVICE, OPEN APPROACH: ICD-10-PCS | Performed by: STUDENT IN AN ORGANIZED HEALTH CARE EDUCATION/TRAINING PROGRAM

## 2024-11-20 PROCEDURE — 2709999900 HC NON-CHARGEABLE SUPPLY: Performed by: STUDENT IN AN ORGANIZED HEALTH CARE EDUCATION/TRAINING PROGRAM

## 2024-11-20 PROCEDURE — 84100 ASSAY OF PHOSPHORUS: CPT

## 2024-11-20 PROCEDURE — 3700000001 HC ADD 15 MINUTES (ANESTHESIA): Performed by: STUDENT IN AN ORGANIZED HEALTH CARE EDUCATION/TRAINING PROGRAM

## 2024-11-20 PROCEDURE — 80053 COMPREHEN METABOLIC PANEL: CPT

## 2024-11-20 DEVICE — CORTICAL SCREW, CAPTURED, 5.0 MM X 44 MM
Type: IMPLANTABLE DEVICE | Site: HIP | Status: FUNCTIONAL
Brand: ARTHREX®

## 2024-11-20 DEVICE — TELESCOPING LAG SCREW, 10.5 MM X 110 MM
Type: IMPLANTABLE DEVICE | Site: HIP | Status: FUNCTIONAL
Brand: ARTHREX®

## 2024-11-20 RX ORDER — DEXMEDETOMIDINE HYDROCHLORIDE 100 UG/ML
INJECTION, SOLUTION INTRAVENOUS
Status: DISCONTINUED | OUTPATIENT
Start: 2024-11-20 | End: 2024-11-20 | Stop reason: SDUPTHER

## 2024-11-20 RX ORDER — ONDANSETRON 2 MG/ML
4 INJECTION INTRAMUSCULAR; INTRAVENOUS
Status: DISCONTINUED | OUTPATIENT
Start: 2024-11-20 | End: 2024-11-20 | Stop reason: HOSPADM

## 2024-11-20 RX ORDER — MEPERIDINE HYDROCHLORIDE 25 MG/ML
INJECTION INTRAMUSCULAR; INTRAVENOUS; SUBCUTANEOUS
Status: COMPLETED
Start: 2024-11-20 | End: 2024-11-20

## 2024-11-20 RX ORDER — MEPERIDINE HYDROCHLORIDE 25 MG/ML
25 INJECTION INTRAMUSCULAR; INTRAVENOUS; SUBCUTANEOUS EVERY 5 MIN PRN
Status: DISCONTINUED | OUTPATIENT
Start: 2024-11-20 | End: 2024-11-20

## 2024-11-20 RX ORDER — GLYCOPYRROLATE 0.2 MG/ML
INJECTION INTRAMUSCULAR; INTRAVENOUS
Status: DISCONTINUED | OUTPATIENT
Start: 2024-11-20 | End: 2024-11-20 | Stop reason: SDUPTHER

## 2024-11-20 RX ORDER — SODIUM CHLORIDE 9 MG/ML
INJECTION, SOLUTION INTRAVENOUS
Status: DISCONTINUED | OUTPATIENT
Start: 2024-11-20 | End: 2024-11-20 | Stop reason: SDUPTHER

## 2024-11-20 RX ORDER — FENTANYL CITRATE 50 UG/ML
INJECTION, SOLUTION INTRAMUSCULAR; INTRAVENOUS
Status: COMPLETED | OUTPATIENT
Start: 2024-11-20 | End: 2024-11-20

## 2024-11-20 RX ORDER — MEPERIDINE HYDROCHLORIDE 50 MG/ML
12.5 INJECTION INTRAMUSCULAR; INTRAVENOUS; SUBCUTANEOUS ONCE
Status: DISCONTINUED | OUTPATIENT
Start: 2024-11-20 | End: 2024-11-20

## 2024-11-20 RX ORDER — BUPIVACAINE HYDROCHLORIDE 7.5 MG/ML
INJECTION, SOLUTION INTRASPINAL
Status: COMPLETED | OUTPATIENT
Start: 2024-11-20 | End: 2024-11-20

## 2024-11-20 RX ORDER — KETOROLAC TROMETHAMINE 30 MG/ML
30 INJECTION, SOLUTION INTRAMUSCULAR; INTRAVENOUS EVERY 6 HOURS PRN
Status: DISCONTINUED | OUTPATIENT
Start: 2024-11-20 | End: 2024-11-20 | Stop reason: HOSPADM

## 2024-11-20 RX ORDER — METHOCARBAMOL 100 MG/ML
1000 INJECTION, SOLUTION INTRAMUSCULAR; INTRAVENOUS ONCE
Status: DISCONTINUED | OUTPATIENT
Start: 2024-11-20 | End: 2024-11-20

## 2024-11-20 RX ORDER — PROPOFOL 10 MG/ML
INJECTION, EMULSION INTRAVENOUS
Status: DISCONTINUED | OUTPATIENT
Start: 2024-11-20 | End: 2024-11-20 | Stop reason: SDUPTHER

## 2024-11-20 RX ORDER — FENTANYL CITRATE 50 UG/ML
INJECTION, SOLUTION INTRAMUSCULAR; INTRAVENOUS
Status: DISCONTINUED | OUTPATIENT
Start: 2024-11-20 | End: 2024-11-20 | Stop reason: SDUPTHER

## 2024-11-20 RX ORDER — OXYCODONE AND ACETAMINOPHEN 5; 325 MG/1; MG/1
1 TABLET ORAL EVERY 6 HOURS PRN
Qty: 28 TABLET | Refills: 0 | Status: SHIPPED | OUTPATIENT
Start: 2024-11-20 | End: 2024-11-27

## 2024-11-20 RX ORDER — SODIUM CHLORIDE 0.9 % (FLUSH) 0.9 %
5-40 SYRINGE (ML) INJECTION PRN
Status: DISCONTINUED | OUTPATIENT
Start: 2024-11-20 | End: 2024-11-20 | Stop reason: HOSPADM

## 2024-11-20 RX ORDER — MIDAZOLAM HYDROCHLORIDE 1 MG/ML
INJECTION, SOLUTION INTRAMUSCULAR; INTRAVENOUS
Status: DISCONTINUED | OUTPATIENT
Start: 2024-11-20 | End: 2024-11-20 | Stop reason: SDUPTHER

## 2024-11-20 RX ORDER — FENTANYL CITRATE 0.05 MG/ML
50 INJECTION, SOLUTION INTRAMUSCULAR; INTRAVENOUS EVERY 5 MIN PRN
Status: DISCONTINUED | OUTPATIENT
Start: 2024-11-20 | End: 2024-11-20

## 2024-11-20 RX ORDER — NALOXONE HYDROCHLORIDE 0.4 MG/ML
INJECTION, SOLUTION INTRAMUSCULAR; INTRAVENOUS; SUBCUTANEOUS PRN
Status: DISCONTINUED | OUTPATIENT
Start: 2024-11-20 | End: 2024-11-20 | Stop reason: HOSPADM

## 2024-11-20 RX ORDER — SODIUM CHLORIDE 0.9 % (FLUSH) 0.9 %
5-40 SYRINGE (ML) INJECTION EVERY 12 HOURS SCHEDULED
Status: DISCONTINUED | OUTPATIENT
Start: 2024-11-20 | End: 2024-11-20 | Stop reason: HOSPADM

## 2024-11-20 RX ORDER — SODIUM CHLORIDE 9 MG/ML
INJECTION, SOLUTION INTRAVENOUS PRN
Status: DISCONTINUED | OUTPATIENT
Start: 2024-11-20 | End: 2024-11-20 | Stop reason: HOSPADM

## 2024-11-20 RX ORDER — MEPERIDINE HYDROCHLORIDE 50 MG/ML
12.5 INJECTION INTRAMUSCULAR; INTRAVENOUS; SUBCUTANEOUS ONCE
Status: DISCONTINUED | OUTPATIENT
Start: 2024-11-20 | End: 2024-11-23 | Stop reason: HOSPADM

## 2024-11-20 RX ORDER — IPRATROPIUM BROMIDE AND ALBUTEROL SULFATE 2.5; .5 MG/3ML; MG/3ML
1 SOLUTION RESPIRATORY (INHALATION)
Status: DISCONTINUED | OUTPATIENT
Start: 2024-11-20 | End: 2024-11-20 | Stop reason: HOSPADM

## 2024-11-20 RX ORDER — MIDAZOLAM HYDROCHLORIDE 1 MG/ML
2 INJECTION, SOLUTION INTRAMUSCULAR; INTRAVENOUS
Status: DISCONTINUED | OUTPATIENT
Start: 2024-11-20 | End: 2024-11-20 | Stop reason: HOSPADM

## 2024-11-20 RX ORDER — ASPIRIN 325 MG
325 TABLET, DELAYED RELEASE (ENTERIC COATED) ORAL 2 TIMES DAILY
Qty: 56 TABLET | Refills: 0 | Status: SHIPPED | OUTPATIENT
Start: 2024-11-20 | End: 2024-12-18

## 2024-11-20 RX ADMIN — FENTANYL CITRATE 50 MCG: 50 INJECTION, SOLUTION INTRAMUSCULAR; INTRAVENOUS at 18:30

## 2024-11-20 RX ADMIN — MEPERIDINE HYDROCHLORIDE 12.5 MG: 25 INJECTION INTRAMUSCULAR; INTRAVENOUS; SUBCUTANEOUS at 21:29

## 2024-11-20 RX ADMIN — FENTANYL CITRATE 50 MCG: 0.05 INJECTION, SOLUTION INTRAMUSCULAR; INTRAVENOUS at 17:02

## 2024-11-20 RX ADMIN — FENTANYL CITRATE 50 MCG: 0.05 INJECTION, SOLUTION INTRAMUSCULAR; INTRAVENOUS at 04:34

## 2024-11-20 RX ADMIN — FENTANYL CITRATE 50 MCG: 0.05 INJECTION, SOLUTION INTRAMUSCULAR; INTRAVENOUS at 08:38

## 2024-11-20 RX ADMIN — MIDAZOLAM 2 MG: 1 INJECTION INTRAMUSCULAR; INTRAVENOUS at 18:22

## 2024-11-20 RX ADMIN — OXYCODONE HYDROCHLORIDE AND ACETAMINOPHEN 1 TABLET: 5; 325 TABLET ORAL at 23:37

## 2024-11-20 RX ADMIN — FENTANYL CITRATE 25 MCG: 50 INJECTION, SOLUTION INTRAMUSCULAR; INTRAVENOUS at 18:37

## 2024-11-20 RX ADMIN — LOSARTAN POTASSIUM 50 MG: 50 TABLET, FILM COATED ORAL at 08:36

## 2024-11-20 RX ADMIN — PROPOFOL INJECTABLE EMULSION 75 MCG/KG/MIN: 10 INJECTION, EMULSION INTRAVENOUS at 18:27

## 2024-11-20 RX ADMIN — PHENYLEPHRINE HYDROCHLORIDE 200 MCG: 10 INJECTION INTRAVENOUS at 20:10

## 2024-11-20 RX ADMIN — FENTANYL CITRATE 50 MCG: 0.05 INJECTION, SOLUTION INTRAMUSCULAR; INTRAVENOUS at 13:10

## 2024-11-20 RX ADMIN — OXYCODONE HYDROCHLORIDE AND ACETAMINOPHEN 1 TABLET: 5; 325 TABLET ORAL at 02:10

## 2024-11-20 RX ADMIN — OXYCODONE HYDROCHLORIDE AND ACETAMINOPHEN 1 TABLET: 5; 325 TABLET ORAL at 10:33

## 2024-11-20 RX ADMIN — PHENYLEPHRINE HYDROCHLORIDE 100 MCG: 10 INJECTION INTRAVENOUS at 19:05

## 2024-11-20 RX ADMIN — CITALOPRAM HYDROBROMIDE 30 MG: 10 TABLET ORAL at 08:36

## 2024-11-20 RX ADMIN — AMLODIPINE BESYLATE 5 MG: 5 TABLET ORAL at 08:36

## 2024-11-20 RX ADMIN — DEXMEDETOMIDINE HYDROCHLORIDE 10 MCG: 100 INJECTION, SOLUTION INTRAVENOUS at 19:03

## 2024-11-20 RX ADMIN — SODIUM CHLORIDE: 9 INJECTION, SOLUTION INTRAVENOUS at 18:23

## 2024-11-20 RX ADMIN — PHENYLEPHRINE HYDROCHLORIDE 200 MCG: 10 INJECTION INTRAVENOUS at 19:52

## 2024-11-20 RX ADMIN — OXYCODONE HYDROCHLORIDE AND ACETAMINOPHEN 1 TABLET: 5; 325 TABLET ORAL at 06:36

## 2024-11-20 RX ADMIN — CEFAZOLIN 2000 MG: 2 INJECTION, POWDER, FOR SOLUTION INTRAMUSCULAR; INTRAVENOUS at 23:38

## 2024-11-20 RX ADMIN — PHENYLEPHRINE HYDROCHLORIDE 200 MCG: 10 INJECTION INTRAVENOUS at 20:22

## 2024-11-20 RX ADMIN — PHENYLEPHRINE HYDROCHLORIDE 100 MCG: 10 INJECTION INTRAVENOUS at 19:30

## 2024-11-20 RX ADMIN — GLYCOPYRROLATE 0.2 MG: 0.2 INJECTION, SOLUTION INTRAMUSCULAR; INTRAVENOUS at 19:54

## 2024-11-20 RX ADMIN — BUPIVACAINE HYDROCHLORIDE IN DEXTROSE 13.5 MG: 7.5 INJECTION, SOLUTION SUBARACHNOID at 18:26

## 2024-11-20 RX ADMIN — PHENYLEPHRINE HYDROCHLORIDE 100 MCG: 10 INJECTION INTRAVENOUS at 19:47

## 2024-11-20 RX ADMIN — FENTANYL CITRATE 25 MCG: 50 INJECTION, SOLUTION INTRAMUSCULAR; INTRAVENOUS at 18:26

## 2024-11-20 RX ADMIN — PHENYLEPHRINE HYDROCHLORIDE 100 MCG: 10 INJECTION INTRAVENOUS at 18:54

## 2024-11-20 RX ADMIN — PHENYLEPHRINE HYDROCHLORIDE 100 MCG: 10 INJECTION INTRAVENOUS at 20:30

## 2024-11-20 ASSESSMENT — PAIN DESCRIPTION - DESCRIPTORS
DESCRIPTORS: ACHING;DISCOMFORT;SHARP
DESCRIPTORS: ACHING;DISCOMFORT;THROBBING
DESCRIPTORS: ACHING
DESCRIPTORS: ACHING;DISCOMFORT;SHARP
DESCRIPTORS: ACHING
DESCRIPTORS: ACHING;STABBING;SORE

## 2024-11-20 ASSESSMENT — PAIN SCALES - GENERAL
PAINLEVEL_OUTOF10: 7
PAINLEVEL_OUTOF10: 6
PAINLEVEL_OUTOF10: 6
PAINLEVEL_OUTOF10: 4
PAINLEVEL_OUTOF10: 8
PAINLEVEL_OUTOF10: 10
PAINLEVEL_OUTOF10: 8
PAINLEVEL_OUTOF10: 3
PAINLEVEL_OUTOF10: 7
PAINLEVEL_OUTOF10: 6

## 2024-11-20 ASSESSMENT — PAIN DESCRIPTION - LOCATION
LOCATION: HIP

## 2024-11-20 ASSESSMENT — PAIN DESCRIPTION - ORIENTATION
ORIENTATION: LEFT
ORIENTATION: RIGHT
ORIENTATION: LEFT
ORIENTATION: RIGHT
ORIENTATION: RIGHT

## 2024-11-20 ASSESSMENT — PAIN SCALES - WONG BAKER
WONGBAKER_NUMERICALRESPONSE: HURTS A LITTLE BIT
WONGBAKER_NUMERICALRESPONSE: HURTS A LITTLE BIT

## 2024-11-20 NOTE — PROGRESS NOTES
Date:2024  Patient Name: Natasha Washburn  MRN: [id number]  : 1965  ROOM #: 0324/0324-02    Occupational Therapy on hold due to awaiting ortho surgery (ORIF right hip @ 1630); please reorder O.T. EVAL and TREAT when the patient is able to participate in therapy. Thank you.     Astrid Sagastume OTR/L #[id number]

## 2024-11-20 NOTE — PROGRESS NOTES
Physical Therapy    Room #: 0324/0324-02  Date: 2024       Patient Name: Natasha Washburn  : 1965      MRN: [id number]     Patient unavailable for physical therapy eval due to scheduled for procedure ORIF right hip @ 1630 . Will attempt PT evaluation after surgery. Thank you.       Niyah Hale, PT

## 2024-11-20 NOTE — CARE COORDINATION
Case Management Assessment  Initial Evaluation    Date/Time of Evaluation: 11/20/2024 2:19 PM  Assessment Completed by: TONY Cooper    If patient is discharged prior to next notation, then this note serves as note for discharge by case management.    Patient Name: Natasha Washburn                   YOB: 1965  Diagnosis: Closed fracture of right hip, initial encounter ([facility]) [S72.001A]  Intertrochanteric fracture of left hip, closed, initial encounter ([facility]) [S72.142A]                   Date / Time: 11/19/2024 10:50 AM    Patient Admission Status: Inpatient   Readmission Risk (Low < 19, Mod (19-27), High > 27): Readmission Risk Score: 7.7    Current PCP: Landon Cortés, DO  PCP verified by CM? Yes    Chart Reviewed: Yes      History Provided by: Patient  Patient Orientation: Alert and Oriented    Patient Cognition: Alert    Hospitalization in the last 30 days (Readmission):  No    If yes, Readmission Assessment in CM Navigator will be completed.    Advance Directives:      Code Status: Full Code   Patient's Primary Decision Maker is: Legal Next of Kin    Primary Decision Maker: Titi Washburn - Spouse - [phone]    Secondary Decision Maker: Magda Cox - Brother/Sister - [phone]    Discharge Planning:    Patient lives with: Spouse/Significant Other Type of Home: House  Primary Care Giver: Self  Patient Support Systems include: Spouse/Significant Other   Current Financial resources:    Current community resources:    Current services prior to admission: None            Current DME:              Type of Home Care services:  None    ADLS  Prior functional level: Independent in ADLs/IADLs  Current functional level: Other (see comment) (unknown- pt having surgery, PT/OT to follow post-op)    PT AM-PAC:   /24  OT AM-PAC:   /24    Family can provide assistance at DC: Yes  Would you like Case Management to discuss the discharge plan with any other family members/significant others,  and if so, who? Yes (pt's sister or )  Plans to Return to Present Housing: Unknown at present  Other Identified Issues/Barriers to RETURNING to current housing: Orthopedic surgery- workman's comp claim  Potential Assistance needed at discharge: HH- PT/OT            Potential DME:    Patient expects to discharge to: House   Plan for transportation at discharge:      Financial    Payor: [employer] / Plan: [employer] [address] / Product Type: *No Product type* /     Potential assistance Purchasing Medications: No  Meds-to-Beds request:        [facility] - [place], [place] - [address] - P [phone] - F [phone]  [address] [facility] [address]  Phone: [phone] Fax: [phone]    [id number] [address] [facility] -  [phone] - F [phone]  [address] [facility] [place] [address]  Phone: [phone] Fax: [phone]    [id number] [place], [place] - [address] [facility]  -  [phone] - F [phone]  [address] [facility] [address]  Phone: [phone] Fax: [phone]      Notes:    Factors facilitating achievement of predicted outcomes: Family support, Motivated, Cooperative, and Pleasant    Barriers to discharge: Orthopedic surgery- Pain, Decreased endurance, and Lower extremity weakness    Additional Case Management Notes: 11/20/2024: SS NOTE:  Met with pt and pt's sisterNat at her bedside, introduced self and role for transition of care planning, discussed anticipated rehab needs, pt currently a&o, pleasant, pt is a , presents with a left hip fracture from a fall at work requiring orthopedic surgical repair for a ORIF scheduled for later today, pt lives with her  in a 2 story home and was independent prior to her injury, first report of injury in soft chart & will need completed by physician for workman's comp C-9 claim #, pt's  is planning to take a

## 2024-11-20 NOTE — PROGRESS NOTES
Spiritual Health History and Assessment/Progress Note  [facility]     Encounter, Rituals, Rites and Sacraments,  ,  ,      Name: Natasha Washburn MRN: [id number]    Age: 59 y.o.     Sex: female   Language: English   Mandaeism: Restorationism   Intertrochanteric fracture of left hip, closed, initial encounter ([facility])     Date: 11/20/2024                           Spiritual Assessment began in [facility] MED SURG        Referral/Consult From: Rounding   Encounter Overview/Reason:  Encounter, Rituals, Rites and Sacraments  Service Provided For: Patient    Luzmaria, Belief, Meaning:   Patient is connected with a luzmaria tradition or spiritual practice  Family/Friends are connected with a luzmaria tradition or spiritual practice      Importance and Influence:  Patient has no beliefs influential to healthcare decision-making identified during this visit  Family/Friends have no beliefs influential to healthcare decision-making identified during this visit    Community:  Patient feels well-supported. Support system includes: Spouse/Partner  Family/Friends feel well-supported. Support system includes: Parent/s and Extended family    Assessment and Plan of Care:     Patient Interventions include: Provided sacramental/Gnosticism ritual  Family/Friends Interventions include: Affirmed coping skills/support systems    Patient Plan of Care: Spiritual Care available upon further referral  Family/Friends Plan of Care: Spiritual Care available upon further referral    Electronically signed by Chaplain Beverly on 11/20/2024 at 4:01 PM

## 2024-11-20 NOTE — PLAN OF CARE
Problem: Discharge Planning  Goal: Discharge to home or other facility with appropriate resources  11/19/2024 2201 by Jaycee Wallace RN  Outcome: Progressing  11/19/2024 1815 by Tatiana Shaffer RN  Outcome: Progressing  Flowsheets (Taken 11/19/2024 1734)  Discharge to home or other facility with appropriate resources:   Identify barriers to discharge with patient and caregiver   Arrange for interpreters to assist at discharge as needed   Arrange for needed discharge resources and transportation as appropriate   Refer to discharge planning if patient needs post-hospital services based on physician order or complex needs related to functional status, cognitive ability or social support system   Identify discharge learning needs (meds, wound care, etc)     Problem: Safety - Adult  Goal: Free from fall injury  11/19/2024 2201 by Jaycee Wallace RN  Outcome: Progressing  11/19/2024 1815 by Tatiana Shaffer, RN  Outcome: Progressing     Problem: ABCDS Injury Assessment  Goal: Absence of physical injury  11/19/2024 2201 by Jaycee Wallace RN  Outcome: Progressing  11/19/2024 1815 by Tatiana Shaffer, RN  Outcome: Progressing     Problem: Pain  Goal: Verbalizes/displays adequate comfort level or baseline comfort level  Outcome: Progressing

## 2024-11-20 NOTE — ANESTHESIA PRE PROCEDURE
prochlorperazine (COMPAZINE) injection 10 mg  10 mg IntraVENous Q6H PRN Dunne, Dayron A, DO        polyethylene glycol (GLYCOLAX) packet 17 g  17 g Oral Daily PRN Dunne, Dayron A, DO        oxyCODONE-acetaminophen (PERCOCET) 5-325 MG per tablet 1 tablet  1 tablet Oral Q4H PRN Dunne, Dayron A, DO   1 tablet at 11/20/24 1033    fentaNYL (SUBLIMAZE) injection 50 mcg  50 mcg IntraVENous Q4H PRN Dunne, Dayron A, DO   50 mcg at 11/20/24 1702    fentaNYL (SUBLIMAZE) injection 100 mcg  100 mcg IntraVENous Q2H PRN Dunne, Dayron A, DO           Allergies:    Allergies   Allergen Reactions    Erythromycin     Sulfa Antibiotics Nausea And Vomiting       Problem List:    Patient Active Problem List   Diagnosis Code    Essential hypertension I10    Intertrochanteric fracture of left hip, closed, initial encounter ([facility]) S72.142A       Past Medical History:        Diagnosis Date    Anxiety     Hypertension     Nondependent alcohol abuse        Past Surgical History:        Procedure Laterality Date    BACK SURGERY      BREAST SURGERY         Social History:    Social History     Tobacco Use    Smoking status: Never    Smokeless tobacco: Never   Substance Use Topics    Alcohol use: Never                                Counseling given: Not Answered      Vital Signs (Current):   Vitals:    11/20/24 0536 11/20/24 0636 11/20/24 0834 11/20/24 1653   BP: 113/73  122/74 (!) 144/82   Pulse: 72  74 77   Resp: 16 18  16   Temp: 98 °F (36.7 °C)   99 °F (37.2 °C)   TempSrc: Temporal   Oral   SpO2: 97%   95%   Weight:       Height:                                                  BP Readings from Last 3 Encounters:   11/20/24 (!) 144/82   07/01/24 129/84   03/26/24 (!) 149/95       NPO Status: Time of last liquid consumption: 2200                        Time of last solid consumption: 1800                        Date of last liquid consumption: 11/19/24                        Date of last solid food consumption: 11/19/24    BMI:   Wt

## 2024-11-20 NOTE — ACP (ADVANCE CARE PLANNING)
Advance Care Planning   Healthcare Decision Maker:    Primary Decision Maker: Titi Washburn - Spouse - [phone]    Secondary Decision Maker: Magda Cox - Brother/Sister - [phone]    Click here to complete Healthcare Decision Makers including selection of the Healthcare Decision Maker Relationship (ie \"Primary\").  Today we documented Decision Maker(s) consistent with Legal Next of Kin hierarchy.

## 2024-11-21 LAB
ALBUMIN SERPL-MCNC: 3.6 G/DL (ref 3.5–5.2)
ALP SERPL-CCNC: 63 U/L (ref 35–104)
ALT SERPL-CCNC: 17 U/L (ref 0–32)
AMPHET UR QL SCN: NEGATIVE
ANION GAP SERPL CALCULATED.3IONS-SCNC: 8 MMOL/L (ref 7–16)
AST SERPL-CCNC: 27 U/L (ref 0–31)
BARBITURATES UR QL SCN: NEGATIVE
BASOPHILS # BLD: 0.06 K/UL (ref 0–0.2)
BASOPHILS NFR BLD: 1 % (ref 0–2)
BENZODIAZ UR QL: POSITIVE
BILIRUB SERPL-MCNC: 1.5 MG/DL (ref 0–1.2)
BUN SERPL-MCNC: 10 MG/DL (ref 6–20)
BUPRENORPHINE UR QL: NEGATIVE
CALCIUM SERPL-MCNC: 8.2 MG/DL (ref 8.6–10.2)
CANNABINOIDS UR QL SCN: NEGATIVE
CHLORIDE SERPL-SCNC: 98 MMOL/L (ref 98–107)
CO2 SERPL-SCNC: 25 MMOL/L (ref 22–29)
COCAINE UR QL SCN: NEGATIVE
CREAT SERPL-MCNC: 0.5 MG/DL (ref 0.5–1)
EOSINOPHIL # BLD: 0.06 K/UL (ref 0.05–0.5)
EOSINOPHILS RELATIVE PERCENT: 1 % (ref 0–6)
ERYTHROCYTE [DISTWIDTH] IN BLOOD BY AUTOMATED COUNT: 13 % (ref 11.5–15)
FENTANYL UR QL: POSITIVE
GFR, ESTIMATED: >90 ML/MIN/1.73M2
GLUCOSE SERPL-MCNC: 128 MG/DL (ref 74–99)
HCT VFR BLD AUTO: 28.1 % (ref 34–48)
HGB BLD-MCNC: 9.8 G/DL (ref 11.5–15.5)
LYMPHOCYTES NFR BLD: 0.45 K/UL (ref 1.5–4)
LYMPHOCYTES RELATIVE PERCENT: 7 % (ref 20–42)
MCH RBC QN AUTO: 31.3 PG (ref 26–35)
MCHC RBC AUTO-ENTMCNC: 34.9 G/DL (ref 32–34.5)
MCV RBC AUTO: 89.8 FL (ref 80–99.9)
METHADONE UR QL: NEGATIVE
MONOCYTES NFR BLD: 0.4 K/UL (ref 0.1–0.95)
MONOCYTES NFR BLD: 6 % (ref 2–12)
NEUTROPHILS NFR BLD: 85 % (ref 43–80)
NEUTS SEG NFR BLD: 5.54 K/UL (ref 1.8–7.3)
OPIATES UR QL SCN: NEGATIVE
OXYCODONE UR QL SCN: POSITIVE
PCP UR QL SCN: NEGATIVE
PLATELET # BLD AUTO: 184 K/UL (ref 130–450)
PMV BLD AUTO: 8.6 FL (ref 7–12)
POTASSIUM SERPL-SCNC: 3.9 MMOL/L (ref 3.5–5)
PROT SERPL-MCNC: 5.6 G/DL (ref 6.4–8.3)
RBC # BLD AUTO: 3.13 M/UL (ref 3.5–5.5)
RBC # BLD: ABNORMAL 10*6/UL
SODIUM SERPL-SCNC: 131 MMOL/L (ref 132–146)
TEST INFORMATION: ABNORMAL
WBC OTHER # BLD: 6.5 K/UL (ref 4.5–11.5)

## 2024-11-21 PROCEDURE — 97530 THERAPEUTIC ACTIVITIES: CPT | Performed by: PHYSICAL THERAPIST

## 2024-11-21 PROCEDURE — 6360000002 HC RX W HCPCS: Performed by: ORTHOPAEDIC SURGERY

## 2024-11-21 PROCEDURE — 1200000000 HC SEMI PRIVATE

## 2024-11-21 PROCEDURE — 80307 DRUG TEST PRSMV CHEM ANLYZR: CPT

## 2024-11-21 PROCEDURE — 80053 COMPREHEN METABOLIC PANEL: CPT

## 2024-11-21 PROCEDURE — 97530 THERAPEUTIC ACTIVITIES: CPT

## 2024-11-21 PROCEDURE — 97165 OT EVAL LOW COMPLEX 30 MIN: CPT

## 2024-11-21 PROCEDURE — 85025 COMPLETE CBC W/AUTO DIFF WBC: CPT

## 2024-11-21 PROCEDURE — 97535 SELF CARE MNGMENT TRAINING: CPT

## 2024-11-21 PROCEDURE — 2580000003 HC RX 258: Performed by: ORTHOPAEDIC SURGERY

## 2024-11-21 PROCEDURE — 36415 COLL VENOUS BLD VENIPUNCTURE: CPT

## 2024-11-21 PROCEDURE — 6370000000 HC RX 637 (ALT 250 FOR IP): Performed by: ORTHOPAEDIC SURGERY

## 2024-11-21 PROCEDURE — 97161 PT EVAL LOW COMPLEX 20 MIN: CPT | Performed by: PHYSICAL THERAPIST

## 2024-11-21 RX ADMIN — OXYCODONE HYDROCHLORIDE AND ACETAMINOPHEN 1 TABLET: 5; 325 TABLET ORAL at 08:18

## 2024-11-21 RX ADMIN — CEFAZOLIN 2000 MG: 2 INJECTION, POWDER, FOR SOLUTION INTRAMUSCULAR; INTRAVENOUS at 06:15

## 2024-11-21 RX ADMIN — OXYCODONE HYDROCHLORIDE AND ACETAMINOPHEN 1 TABLET: 5; 325 TABLET ORAL at 12:35

## 2024-11-21 RX ADMIN — OXYCODONE HYDROCHLORIDE AND ACETAMINOPHEN 1 TABLET: 5; 325 TABLET ORAL at 16:41

## 2024-11-21 RX ADMIN — CITALOPRAM HYDROBROMIDE 30 MG: 10 TABLET ORAL at 08:19

## 2024-11-21 RX ADMIN — OXYCODONE HYDROCHLORIDE AND ACETAMINOPHEN 1 TABLET: 5; 325 TABLET ORAL at 20:48

## 2024-11-21 RX ADMIN — PROCHLORPERAZINE EDISYLATE 10 MG: 5 INJECTION INTRAMUSCULAR; INTRAVENOUS at 00:11

## 2024-11-21 RX ADMIN — LOSARTAN POTASSIUM 50 MG: 50 TABLET, FILM COATED ORAL at 08:19

## 2024-11-21 RX ADMIN — ENOXAPARIN SODIUM 40 MG: 100 INJECTION SUBCUTANEOUS at 08:22

## 2024-11-21 RX ADMIN — AMLODIPINE BESYLATE 5 MG: 5 TABLET ORAL at 08:19

## 2024-11-21 RX ADMIN — OXYCODONE HYDROCHLORIDE AND ACETAMINOPHEN 1 TABLET: 5; 325 TABLET ORAL at 03:45

## 2024-11-21 RX ADMIN — FENTANYL CITRATE 50 MCG: 0.05 INJECTION, SOLUTION INTRAMUSCULAR; INTRAVENOUS at 01:46

## 2024-11-21 ASSESSMENT — PAIN DESCRIPTION - LOCATION
LOCATION: HIP

## 2024-11-21 ASSESSMENT — PAIN DESCRIPTION - ORIENTATION
ORIENTATION: RIGHT

## 2024-11-21 ASSESSMENT — PAIN DESCRIPTION - DESCRIPTORS
DESCRIPTORS: ACHING
DESCRIPTORS: ACHING
DESCRIPTORS: ACHING;THROBBING;SORE
DESCRIPTORS: ACHING;SORE;STABBING
DESCRIPTORS: ACHING
DESCRIPTORS: THROBBING;ACHING;DISCOMFORT

## 2024-11-21 ASSESSMENT — PAIN SCALES - GENERAL
PAINLEVEL_OUTOF10: 6
PAINLEVEL_OUTOF10: 0
PAINLEVEL_OUTOF10: 7
PAINLEVEL_OUTOF10: 6
PAINLEVEL_OUTOF10: 5
PAINLEVEL_OUTOF10: 0
PAINLEVEL_OUTOF10: 0

## 2024-11-21 ASSESSMENT — PAIN - FUNCTIONAL ASSESSMENT: PAIN_FUNCTIONAL_ASSESSMENT: ACTIVITIES ARE NOT PREVENTED

## 2024-11-21 NOTE — H&P
earache, sore throat or nasal congestion.    Resp: Denies cough, hemoptysis or sputum production.    Cardiac: Denies chest pain, SOB, diaphoresis or palpitations.    GI: No nausea, vomiting, diarrhea or constipation.  No melena or hematochezia.    : No urinary complaints, dysuria, hematuria or frequency.    MSK: + R hip pain. No extremity weakness, paralysis or paresthesias.     PHYSICAL EXAM:    Vitals:  /78   Pulse 80   Temp 98.1 °F (36.7 °C) (Oral)   Resp 18   Ht 1.753 m (5' 9\")   Wt 80.7 kg (178 lb)   SpO2 98%   BMI 26.29 kg/m²     General:  This is a 59 y.o. yo female who is alert and oriented in NAD  HEENT:  Head is normocephalic and atraumatic, PERRLA, EOMI, mucus membranes moist with no pharyngeal erythema or exudate.  Neck:  Supple with no carotid bruits, JVD or thyromegaly.  No cervical adenopathy  CV:  Regular rate and rhythm, no murmurs  Lungs:  Clear to auscultation bilaterally with no wheezes, rales or rhonchi  Abdomen:  Soft, nontender, nondistended, bowel sounds present  Extremities:  + R hip pain, ecchymosis, edema, peripheral pulses intact bilaterally  Neuro:  Cranial nerves II-XII grossly intact; motor and sensory function intact with no focal deficits  Skin:  No rashes, lesions or wounds    DATA:  CBC with Differential:    Lab Results   Component Value Date/Time    WBC 6.5 11/21/2024 04:46 AM    RBC 3.13 11/21/2024 04:46 AM    HGB 9.8 11/21/2024 04:46 AM    HCT 28.1 11/21/2024 04:46 AM     11/21/2024 04:46 AM    MCV 89.8 11/21/2024 04:46 AM    MCH 31.3 11/21/2024 04:46 AM    MCHC 34.9 11/21/2024 04:46 AM    RDW 13.0 11/21/2024 04:46 AM    LYMPHOPCT 7 11/21/2024 04:46 AM    MONOPCT 6 11/21/2024 04:46 AM    EOSPCT 1 11/21/2024 04:46 AM    BASOPCT 1 11/21/2024 04:46 AM    MONOSABS 0.40 11/21/2024 04:46 AM    LYMPHSABS 0.45 11/21/2024 04:46 AM    EOSABS 0.06 11/21/2024 04:46 AM    BASOSABS 0.06 11/21/2024 04:46 AM     CMP:    Lab Results   Component Value Date/Time      Noted    Intertrochanteric fracture of left hip, closed, initial encounter ([facility]) 11/19/2024    Essential hypertension 07/27/2017     Impression:  Right hip femur fracture  HTN  Anxiety  TSH 4.99    Plan:  Admit to Med/surg floor  Home medicine reviewed  Ortho consult  General diet  Lovenox 40 mg post op  PT/OT  ECG  Patient is medically cleared for surgery  Free T4    CBC,CMP in am    Dayron Dunne DO, D.O.  11/20/2024  12:02 PM

## 2024-11-21 NOTE — PROGRESS NOTES
11/21/2024 04:46 AM     11/21/2024 04:46 AM    MCV 89.8 11/21/2024 04:46 AM    MCH 31.3 11/21/2024 04:46 AM    MCHC 34.9 11/21/2024 04:46 AM    RDW 13.0 11/21/2024 04:46 AM    LYMPHOPCT 7 11/21/2024 04:46 AM    MONOPCT 6 11/21/2024 04:46 AM    EOSPCT 1 11/21/2024 04:46 AM    BASOPCT 1 11/21/2024 04:46 AM    MONOSABS 0.40 11/21/2024 04:46 AM    LYMPHSABS 0.45 11/21/2024 04:46 AM    EOSABS 0.06 11/21/2024 04:46 AM    BASOSABS 0.06 11/21/2024 04:46 AM     CMP:    Lab Results   Component Value Date/Time     11/21/2024 04:46 AM    K 3.9 11/21/2024 04:46 AM    CL 98 11/21/2024 04:46 AM    CO2 25 11/21/2024 04:46 AM    BUN 10 11/21/2024 04:46 AM    CREATININE 0.5 11/21/2024 04:46 AM    GFRAA >60 06/24/2022 09:10 AM    LABGLOM >90 11/21/2024 04:46 AM    LABGLOM >60 08/28/2023 04:24 PM    GLUCOSE 128 11/21/2024 04:46 AM    GLUCOSE 93 05/11/2011 11:02 AM    CALCIUM 8.2 11/21/2024 04:46 AM    BILITOT 1.5 11/21/2024 04:46 AM    ALKPHOS 63 11/21/2024 04:46 AM    AST 27 11/21/2024 04:46 AM    ALT 17 11/21/2024 04:46 AM     Magnesium:    Lab Results   Component Value Date/Time    MG 2.0 11/20/2024 04:49 AM     Phosphorus:    Lab Results   Component Value Date/Time    PHOS 3.7 11/20/2024 04:49 AM     PT/INR:  No results found for: \"PROTIME\", \"INR\"  Troponin:  No results found for: \"TROPONINI\"  U/A:    Lab Results   Component Value Date/Time    COLORU Yellow 07/01/2024 10:10 AM    PROTEINU NEGATIVE 07/01/2024 10:10 AM    PHUR 7.0 07/01/2024 10:10 AM    PHUR 7.0 08/28/2023 04:24 PM    WBCUA 0 TO 5 07/01/2024 10:10 AM    RBCUA 0 TO 2 07/01/2024 10:10 AM    BACTERIA FEW 01/01/2019 08:53 AM    CLARITYU Clear 01/01/2019 08:53 AM    SPECGRAV 1.00 05/09/2017 11:22 AM    LEUKOCYTESUR NEGATIVE 07/01/2024 10:10 AM    UROBILINOGEN 0.2 07/01/2024 10:10 AM    BILIRUBINUR NEGATIVE 07/01/2024 10:10 AM    BLOODU SMALL 01/01/2019 08:53 AM    GLUCOSEU NEGATIVE 07/01/2024 10:10 AM     ABG:  No results found for: \"PH\", \"PCO2\", \"PO2\",  \"HCO3\", \"BE\", \"THGB\", \"TCO2\", \"O2SAT\"  HgBA1c:    Lab Results   Component Value Date/Time    LABA1C 5.3 07/01/2024 10:08 AM     FLP:    Lab Results   Component Value Date/Time    TRIG 25 11/20/2024 04:49 AM    HDL 75 11/20/2024 04:49 AM     TSH:    Lab Results   Component Value Date/Time    TSH 4.99 11/20/2024 04:49 AM     IRON:    Lab Results   Component Value Date/Time    IRON 95 07/27/2023 09:29 AM     LIPASE:  No results found for: \"LIPASE\"    ASSESSMENT AND PLAN:      Patient Active Problem List    Diagnosis Date Noted    Intertrochanteric fracture of left hip, closed, initial encounter ([facility]) 11/19/2024    Essential hypertension 07/27/2017     Impression:  Acute right hip femur fracture from a fall  HTN  Anxiety  TSH 4.99, free T4 is 1.3  Acute normocytic anemia postop    Plan:  Admit to Med/surg floor  Home medicine reviewed  Ortho consult  General diet  Lovenox 40 mg post op  PT/OT  ECG  Patient is medically cleared for surgery  Free T4    Colace 100 mg daily  MiraLAX 17 g daily as needed  O2 saturation on room air at rest    CBC,CMP in am    Dayron Dunne DO, [email or web address].[email or web address]  11/21/2024  11:46 AM

## 2024-11-21 NOTE — PROGRESS NOTES
OCCUPATIONAL THERAPY INITIAL EVALUATION    [facility]  [address] [place]        Date:2024                                                  Patient Name: Natahsa Washburn    MRN: [id number]    : 1965    Room[facility]-      Evaluating OT: Astrid Sagastume OTR/L; [id number]     Referring Provider and Specific Provider Orders/Date:      24  OT eval and treat  Start:  24,   End:  24,   ONE TIME,   Standing Count:  1 Occurrences,   R        Last continued at transfer on  10:15 PM    Chinmay Sharp, DO      Placement Recommendation: HHOT       Diagnosis:   1. Closed fracture of right hip, initial encounter ([facility])    2. Intertrochanteric fracture of left hip, closed, initial encounter ([facility])         Surgery:  right cephalomedullary nail on 2024      Pertinent Medical History:       Past Medical History:   Diagnosis Date    Anxiety     Hypertension     Nondependent alcohol abuse          Past Surgical History:   Procedure Laterality Date    BACK SURGERY      BREAST SURGERY        Precautions:  Fall Risk, WBAT: R LE d/t right cephalomedullary nail on 2024, 3L O2   Citals: 93-95% on 3L O2 via NC, HR 91      Assessment of current deficits:     [x] Functional mobility  [x]ADLs  [x] Strength               []Cognition    [x] Functional transfers   [x] IADLs         [] Safety Awareness   [x]Endurance    [] Fine Coordination              [x] Balance      [] Vision/perception   []Sensation     []Gross Motor Coordination  [] ROM  [] Delirium                   [] Motor Control     OT PLAN OF CARE   OT POC based on physician orders, patient diagnosis and results of clinical assessment    Frequency/Duration 1-3 days/wk for 2 weeks PRN     Specific OT Treatment Interventions to include:   * Instruction/training on adapted ADL techniques and AE recommendations to increase functional independence within

## 2024-11-21 NOTE — ANESTHESIA PROCEDURE NOTES
Spinal Block    Patient location during procedure: OB  End time: 11/20/2024 6:40 PM  Reason for block: primary anesthetic  Staffing  Performed: resident/CRNA and other anesthesia staff   Anesthesiologist: Rashel Carlin MD  Resident/CRNA: Cole Smith APRN - CRNA  Other anesthesia staff: Humaira Swartz APRN - CRNA  Performed by: Cole Smith APRN - CRNA  Authorized by: Rashel Carlin MD    Spinal Block  Patient position: right lateral decubitus  Prep: Betadine and site prepped and draped  Patient monitoring: cardiac monitor, continuous pulse ox, frequent blood pressure checks, oxygen and continuous capnometry  Approach: midline  Location: L3/L4  Provider prep: sterile gloves and mask  Local infiltration: lidocaine  Needle  Needle type: [place]acke   Needle gauge: 22 G  Needle length: 3.5 in  Assessment  Sensory level: T4  Swirl obtained: Yes  Attempts: 3+  Hemodynamics: stable  Preanesthetic Checklist  Completed: patient identified, IV checked, site marked, risks and benefits discussed, surgical/procedural consents, equipment checked, pre-op evaluation, timeout performed, anesthesia consent given, oxygen available, monitors applied/VS acknowledged, fire risk safety assessment completed and verbalized and blood product R/B/A discussed and consented

## 2024-11-21 NOTE — DISCHARGE INSTRUCTIONS
Your information:  Name: Natasha Washburn  : 1965    Your instructions:    Discharge home with home care.  They will call you prior to their first visit.  Follow up with primary care provider and specialists as directed.    Orthopaedics Discharge Instructions   Weight bearing Status - Weight bearing as tolerated - on right lower Extremity  Pain medication Per Prescriptions  Contact Office for Medication Refill-  [phone]  Office can refill pain med every 7 days  If patient discharging to facility then pain control will be continued per facility physician  Ice to operative/injured site for 15-30 minutes of each hour for next 5 days    Recommend that you continue to ice the area 2-3 times per day after this   Elevate operative/injured limb on 2 pillows at home  Goal is to have limb above the heart if able  Continue DVT Prophylaxis (blood clot prevention) as Prescribed: Aspirin 325 mg twice daily   Wound care - Can take off the dressing at the surgical site seven days after the date of surgery. Can just peel off. After, do daily dressing changes as needed until the drainage from the surgical site ceases    Follow Up in Office in 10-14 days. Call Office to Confirm Appointment time and Location - [phone]    Call the office at [phone] for directions or with any questions.  Watch for these significant complications.  Call physician if they or any other problems occur:  Fever over 101°, redness, swelling or warmth at the operative site  Unrelieved nausea    Foul smelling or cloudy drainage at the operative site   Unrelieved pain    Blood soaked dressing. (Some oozing may be normal)     Numb, pale, blue, cold or tingling extremity  Call [phone] anytime you think you may need emergency care. For example, call if:    You passed out (lost consciousness).     You have severe trouble breathing.     You have sudden chest pain and shortness of breath, or you cough up blood.   Call your doctor now or seek

## 2024-11-21 NOTE — PROGRESS NOTES
Department of Orthopedic Surgery  Resident Progress Note    Patient seen and examined at bedside this morning.  Patient states pain is well-controlled and she was able to ambulate with physical therapy.  She currently denies any new complaints.  She denies any new onset numbness, tingling, paresthesias.  She denies any other orthopedic complaints this time.    VITALS:  /78   Pulse 80   Temp 98.1 °F (36.7 °C) (Oral)   Resp 18   Ht 1.753 m (5' 9\")   Wt 80.7 kg (178 lb)   SpO2 98%   BMI 26.29 kg/m²     General: Alert and oriented x 3    MUSCULOSKELETAL:   right lower extremity:  Dressings are clean dry and intact  Compartments are soft and compressible  Motor intact ankle dorsiflexion, plantarflexion, EHL  Distal extremities warm well-perfused  Capillary refill <3 seconds  Sensation intact to sural, saphenous, tibial, peroneal nerve distributions      CBC:   Lab Results   Component Value Date/Time    WBC 6.5 11/21/2024 04:46 AM    HGB 9.8 11/21/2024 04:46 AM    HCT 28.1 11/21/2024 04:46 AM     11/21/2024 04:46 AM         ASSESSMENT  S/P right cephalomedullary nail on 11/21/2024    PLAN    Weightbearing as tolerated right lower extremity  DVT prophylaxis continue Lovenox as prescribed  Multimodal pain control, wean narcotics as able  Aquacel dressing protocol  Physical therapy weightbearing as tolerated right lower extremity  Continue 24-hour postoperative antibiotics  All of the patient's questions were sought and answered patient is currently agreeable to plan      Timothy Hallman DO, PGY1  Orthopaedic Surgery

## 2024-11-21 NOTE — OP NOTE
Operative Note      Patient: Natasha Wasbhurn  YOB: 1965  MRN: [id number]    Date of Procedure: 11/20/2024    Pre-op diagnosis: Right hip intertrochanteric hip fracture    Post-Op Diagnosis: Same       Procedure:  Right Hip intramedullary nailing    Surgeon(s):  Evin Thrasher DO    Assistant:   Resident: Dhruv Ellis DO; Timothy Hallman DO; Chinmay Sharp DO    Anesthesia: General    Estimated Blood Loss (mL): less than 50     Complications: None    Specimens:   * No specimens in log *    Implants: [employer]   Implant Name Type Inv. Item Serial No.  Lot No. LRB No. Used Action   LONG TROCH NAIL [id number]     [id number] Right 1 Implanted   LAG SCREW [id number]     [id number] Right 1 Implanted   CORTICAL SCREW [id number]     [id number] Right 1 Implanted         Drains:   Urinary Catheter 11/19/24 [place] (Active)   Catheter Indications Prolonged immobilization (e.g. unstable thoracic or lumbar spine, multiple traumatic injuries such as pelvic fractures) 11/20/24 0830   Site Assessment Pink 11/20/24 0830   Urine Color Yellow 11/20/24 0830   Urine Appearance Clear 11/20/24 0830   Collection Container Standard 11/20/24 0830   Securement Method Securing device (Describe) 11/20/24 0830   Catheter Best Practices  Drainage bag less than half full 11/20/24 0830   Status Draining;Patent 11/20/24 0830   Output (mL) 350 mL 11/19/24 2158       Findings:  Infection Present At Time Of Surgery (PATOS) (choose all levels that have infection present):  No infection present    OPERATIVE COURSE: The patient was seen and identified outside the operative suite, in which the operative site was marked as appropriate by patient, surgeon, staff, and anesthesia. After intubation, patient was placed on the fracture table. All bony prominences and neurovascular structures were well padded and protexted.  Patient's well leg was flexed, abducted, and secured into a well-padded leg well. The operative leg was placed in  gentle inline traction,.  After being positioned, a time out was performed indicating the appropriate identification of the patient, the procedure to be performed, and the side to be performed upon. This was agreed upon by all individuals in the room.      Traction and reduction maneuver were applied to the operative leg. Fluoroscopy was brought in showing a near-anatomic reduction of the intertrochanteric hip fracture. The affected lower extremity was then sterilely prepped and draped in a standard fashion.      At this point in time, a small incision proximal to the greater trochanter was used with proper hemostasis, and the guide pin for the cephalomedullary nail was then inserted percutaneously under fluoroscopic guidance in the appropriate position in the proximal femur. This was checked under both the AP and lateral.      After this was deemed to be in the appropriate position. The entry reamer was then used to access the proximal femur making sure not to disrupt the abductors.     A ball-tip guidewire was placed down to the knee. X-rays were taken to confirm placement down to the knee. Then the length of the nail was measured and a 390-mm nail was chosen. Sequential reaming was carried out up to a 12.5-mm reamer; therefore, an 11-mm diameter nail was chosen.      The nail was then placed into position under fluoroscopic guidance. The trochar for the lag screw was then placed. An incision was made laterally to allow access with the jig. The guidewire was then inserted under fluoroscopic guidance. Once in an appropriate position, the length of the lag screw was then reamed appropriately. The lag screw was then placed at 110 mm in length .  The lag screw mechanism was then locked into place.     Fluoroscopy was used showing the screw in good position with an acceptable tip-to-apex distance. At this point attention was turned to the distal aspect of the device were a 44-mm cortical was placed with use of trochar.

## 2024-11-21 NOTE — PLAN OF CARE
Problem: Discharge Planning  Goal: Discharge to home or other facility with appropriate resources  Outcome: Progressing     Problem: Safety - Adult  Goal: Free from fall injury  Outcome: Progressing     Problem: ABCDS Injury Assessment  Goal: Absence of physical injury  Outcome: Progressing      36

## 2024-11-21 NOTE — PROGRESS NOTES
Physical therapy plan of care is established based on physician order,  patient diagnosis and clinical assessment    Current Treatment Recommendations:    -Bed Mobility: Lower and upper extremity exercises, and trunk control activities  -Sitting Balance: Incorporate reaching activities to activate trunk muscles   -Standing Balance: Perform strengthening exercises in standing to promote motor control with or without upper extremity support  and Instruct patient on adequate base of support to maintain balance  -Transfers: Provide instruction on proper hand and foot position for adequate transfer of weight onto lower extremities and use of gait device if needed, Cues for hand placement, technique and safety. Provide stabilization to prevent fall , and Assist with extension of knees trunk and hip to accept weight transfer   -Gait: Gait training, Standing activities to improve: base of support, weight shift, weight bearing , and Pregait training to emphasize: Sequencing , Base of support, Device control, Upright, and Safety   -Endurance: Utilize Supervised activities to increase level of endurance to allow for safe functional mobility including transfers and gait   -Stairs: Stair training with instruction on proper technique and hand placement on rail    PT long term treatment goals are located in below grid    Patient and or family understand(s) diagnosis, prognosis, and plan of care.    Frequency of treatments: Patient will be seen  daily.         Prior Level of Function: Patient ambulated independently, drives     Rehab Potential: good   for baseline    Past medical history:   Past Medical History:   Diagnosis Date    Anxiety     Hypertension     Nondependent alcohol abuse      Past Surgical History:   Procedure Laterality Date    BACK SURGERY      BREAST SURGERY         SUBJECTIVE:    Precautions: Activity as tolerated, falls ,  s/p right hip IM nailing, Weight Bearing as Tolerated (Order  tasks, assessment of data, and development of Plan of care and goals.     CPT codes:  Low Complexity PT evaluation (31251)  Therapeutic activities (85412)   10 minutes  1 unit(s)    Niyah Hale, PT

## 2024-11-21 NOTE — ANESTHESIA POSTPROCEDURE EVALUATION
Department of Anesthesiology  Postprocedure Note    Patient: Natasha Washburn  MRN: [id number]  YOB: 1965  Date of evaluation: 11/20/2024    Procedure Summary       Date: 11/20/24 Room / Location: [address]    Anesthesia Start: 1812 Anesthesia Stop:     Procedure: RIGHT FEMUR OPEN REDUCTION INTERNAL FIXATION (Right: Hip) Diagnosis:       S/P right hip fracture      (S/P right hip fracture [Z87.81])    Surgeons: Evin Thrasher DO Responsible Provider: Rashel Carlin MD    Anesthesia Type: spinal ASA Status: 2            Anesthesia Type: No value filed.    Dayanara Phase I:      Dayanara Phase II:      Anesthesia Post Evaluation    Patient location during evaluation: PACU  Patient participation: waiting for patient participation  Level of consciousness: sleepy but conscious and lethargic  Pain score: 2  Airway patency: patent  Nausea & Vomiting: no nausea  Cardiovascular status: hypotensive  Respiratory status: acceptable  Hydration status: euvolemic  Pain management: adequate    No notable events documented.

## 2024-11-22 LAB
ALBUMIN SERPL-MCNC: 3.2 G/DL (ref 3.5–5.2)
ALP SERPL-CCNC: 56 U/L (ref 35–104)
ALT SERPL-CCNC: 17 U/L (ref 0–32)
ANION GAP SERPL CALCULATED.3IONS-SCNC: 7 MMOL/L (ref 7–16)
AST SERPL-CCNC: 30 U/L (ref 0–31)
BASOPHILS # BLD: 0 K/UL (ref 0–0.2)
BASOPHILS NFR BLD: 0 % (ref 0–2)
BILIRUB SERPL-MCNC: 1.4 MG/DL (ref 0–1.2)
BUN SERPL-MCNC: 11 MG/DL (ref 6–20)
CALCIUM SERPL-MCNC: 8.4 MG/DL (ref 8.6–10.2)
CHLORIDE SERPL-SCNC: 95 MMOL/L (ref 98–107)
CO2 SERPL-SCNC: 27 MMOL/L (ref 22–29)
CREAT SERPL-MCNC: 0.6 MG/DL (ref 0.5–1)
EKG ATRIAL RATE: 73 BPM
EKG P AXIS: 65 DEGREES
EKG P-R INTERVAL: 154 MS
EKG Q-T INTERVAL: 410 MS
EKG QRS DURATION: 94 MS
EKG QTC CALCULATION (BAZETT): 451 MS
EKG R AXIS: 49 DEGREES
EKG T AXIS: 63 DEGREES
EKG VENTRICULAR RATE: 73 BPM
EOSINOPHIL # BLD: 0.12 K/UL (ref 0.05–0.5)
EOSINOPHILS RELATIVE PERCENT: 3 % (ref 0–6)
ERYTHROCYTE [DISTWIDTH] IN BLOOD BY AUTOMATED COUNT: 13.2 % (ref 11.5–15)
GFR, ESTIMATED: >90 ML/MIN/1.73M2
GLUCOSE SERPL-MCNC: 130 MG/DL (ref 74–99)
HCT VFR BLD AUTO: 26.3 % (ref 34–48)
HGB BLD-MCNC: 9.1 G/DL (ref 11.5–15.5)
LYMPHOCYTES NFR BLD: 0.57 K/UL (ref 1.5–4)
LYMPHOCYTES RELATIVE PERCENT: 12 % (ref 20–42)
MCH RBC QN AUTO: 31.6 PG (ref 26–35)
MCHC RBC AUTO-ENTMCNC: 34.6 G/DL (ref 32–34.5)
MCV RBC AUTO: 91.3 FL (ref 80–99.9)
MONOCYTES NFR BLD: 0.33 K/UL (ref 0.1–0.95)
MONOCYTES NFR BLD: 7 % (ref 2–12)
NEUTROPHILS NFR BLD: 78 % (ref 43–80)
NEUTS SEG NFR BLD: 3.68 K/UL (ref 1.8–7.3)
PLATELET # BLD AUTO: 179 K/UL (ref 130–450)
PMV BLD AUTO: 9.3 FL (ref 7–12)
POTASSIUM SERPL-SCNC: 3.6 MMOL/L (ref 3.5–5)
PROT SERPL-MCNC: 5.6 G/DL (ref 6.4–8.3)
RBC # BLD AUTO: 2.88 M/UL (ref 3.5–5.5)
RBC # BLD: ABNORMAL 10*6/UL
SODIUM SERPL-SCNC: 129 MMOL/L (ref 132–146)
WBC OTHER # BLD: 4.7 K/UL (ref 4.5–11.5)

## 2024-11-22 PROCEDURE — 1200000000 HC SEMI PRIVATE

## 2024-11-22 PROCEDURE — 36415 COLL VENOUS BLD VENIPUNCTURE: CPT

## 2024-11-22 PROCEDURE — 97530 THERAPEUTIC ACTIVITIES: CPT

## 2024-11-22 PROCEDURE — 6370000000 HC RX 637 (ALT 250 FOR IP): Performed by: ORTHOPAEDIC SURGERY

## 2024-11-22 PROCEDURE — 93010 ELECTROCARDIOGRAM REPORT: CPT | Performed by: INTERNAL MEDICINE

## 2024-11-22 PROCEDURE — 97116 GAIT TRAINING THERAPY: CPT

## 2024-11-22 PROCEDURE — 6360000002 HC RX W HCPCS: Performed by: ORTHOPAEDIC SURGERY

## 2024-11-22 PROCEDURE — 80053 COMPREHEN METABOLIC PANEL: CPT

## 2024-11-22 PROCEDURE — 85025 COMPLETE CBC W/AUTO DIFF WBC: CPT

## 2024-11-22 PROCEDURE — 97110 THERAPEUTIC EXERCISES: CPT

## 2024-11-22 RX ADMIN — LOSARTAN POTASSIUM 50 MG: 50 TABLET, FILM COATED ORAL at 09:56

## 2024-11-22 RX ADMIN — OXYCODONE HYDROCHLORIDE AND ACETAMINOPHEN 1 TABLET: 5; 325 TABLET ORAL at 00:48

## 2024-11-22 RX ADMIN — OXYCODONE HYDROCHLORIDE AND ACETAMINOPHEN 1 TABLET: 5; 325 TABLET ORAL at 06:11

## 2024-11-22 RX ADMIN — ENOXAPARIN SODIUM 40 MG: 100 INJECTION SUBCUTANEOUS at 09:56

## 2024-11-22 RX ADMIN — OXYCODONE HYDROCHLORIDE AND ACETAMINOPHEN 1 TABLET: 5; 325 TABLET ORAL at 14:31

## 2024-11-22 RX ADMIN — OXYCODONE HYDROCHLORIDE AND ACETAMINOPHEN 1 TABLET: 5; 325 TABLET ORAL at 18:47

## 2024-11-22 RX ADMIN — OXYCODONE HYDROCHLORIDE AND ACETAMINOPHEN 1 TABLET: 5; 325 TABLET ORAL at 10:18

## 2024-11-22 RX ADMIN — AMLODIPINE BESYLATE 5 MG: 5 TABLET ORAL at 09:56

## 2024-11-22 RX ADMIN — POLYETHYLENE GLYCOL 3350 17 G: 17 POWDER, FOR SOLUTION ORAL at 19:49

## 2024-11-22 RX ADMIN — CITALOPRAM HYDROBROMIDE 30 MG: 10 TABLET ORAL at 11:12

## 2024-11-22 ASSESSMENT — PAIN SCALES - WONG BAKER
WONGBAKER_NUMERICALRESPONSE: HURTS A LITTLE BIT

## 2024-11-22 ASSESSMENT — PAIN SCALES - GENERAL
PAINLEVEL_OUTOF10: 2
PAINLEVEL_OUTOF10: 2
PAINLEVEL_OUTOF10: 6
PAINLEVEL_OUTOF10: 5
PAINLEVEL_OUTOF10: 4
PAINLEVEL_OUTOF10: 5
PAINLEVEL_OUTOF10: 4
PAINLEVEL_OUTOF10: 6

## 2024-11-22 ASSESSMENT — PAIN DESCRIPTION - ORIENTATION
ORIENTATION: RIGHT;UPPER
ORIENTATION: RIGHT

## 2024-11-22 ASSESSMENT — PAIN - FUNCTIONAL ASSESSMENT
PAIN_FUNCTIONAL_ASSESSMENT: ACTIVITIES ARE NOT PREVENTED
PAIN_FUNCTIONAL_ASSESSMENT: PREVENTS OR INTERFERES SOME ACTIVE ACTIVITIES AND ADLS
PAIN_FUNCTIONAL_ASSESSMENT: ACTIVITIES ARE NOT PREVENTED

## 2024-11-22 ASSESSMENT — PAIN DESCRIPTION - LOCATION
LOCATION: HIP

## 2024-11-22 ASSESSMENT — PAIN DESCRIPTION - DESCRIPTORS
DESCRIPTORS: ACHING;DULL
DESCRIPTORS: ACHING;THROBBING
DESCRIPTORS: THROBBING
DESCRIPTORS: ACHING;DISCOMFORT;THROBBING
DESCRIPTORS: ACHING;DISCOMFORT;THROBBING

## 2024-11-22 NOTE — CARE COORDINATION
CAITLYN note: CAITLYN has requested assistance from Alla's supervisor over BWC cases in obtaining claim number for patient's case as SW is unable to get DME or HHC for patient without a C9 for these services approved.  Unable to process a C9 without a claim number.  Once a claim number is obtained a C9 form will need to be completed, signed by the physician and then submitted to the O for approval.  SW attempted to obtain DME and HHC under patient's commercial insurance however neither the DME provider or the HHC providers can bill commercial insurance if there is a BWC case.  If patient wishes to discharge without these services or private pay for the DME she can do so.    Copy of FROI will be left in the ER for the initial ER physician to complete and sign the physician section in the event that this is needed before a claim number can be obtained.  ER SW will obtain copy from unit nurse and place in the ER for over the weekend and if completed by the ER physician CAITLYN/IVONE department will fax to [facility] on Monday.

## 2024-11-22 NOTE — PROGRESS NOTES
Ambulated patient on room air, oxygen saturation dropped to 81%. Put patient on 2L oxygen via nasal cannula, oxygen now at 95%.

## 2024-11-22 NOTE — CARE COORDINATION
11/22/2024: SS NOTE:  HHC and DME order noted, sw met with pt and pt's  at her bedside for transition of care planning, reviewed therapy evals and HHC/DME recommendations, prefer [facility] HHC/DME, explained process for Workers' Comp coverage for home health therapy to start and that [facility] per Mirtha can provide a w/w and bsc and will deliver dme to pt's hospital room today, will need to wait for C-9 to bill for dme but hospital bed would need prior auth, pt agreeable to do a private pay rental for the hospital bed and Mirtha to arrange for home delivery, pt prefers to be discharge Saturday vs waiting until Monday, she will have help from her family who will transport her home by car and agreed to wait until next week for home health therapy, referral made to [facility] through Kimberlee Ramirez Intake liaison informed and relayed earliest start of care for home therapy would be Tuesday but will need the C-9 form, Nursing staff informed.Electronically signed by TONY Cooper on 11/22/2024 at 3:07 PM

## 2024-11-22 NOTE — PLAN OF CARE
Problem: Discharge Planning  Goal: Discharge to home or other facility with appropriate resources  11/22/2024 1038 by Lola San RN  Outcome: Progressing     Problem: Safety - Adult  Goal: Free from fall injury  11/22/2024 1038 by Lola San RN  Outcome: Progressing     Problem: ABCDS Injury Assessment  Goal: Absence of physical injury  11/22/2024 1038 by Lola San RN  Outcome: Progressing     Problem: Pain  Goal: Verbalizes/displays adequate comfort level or baseline comfort level  11/22/2024 1038 by Lola San RN  Outcome: Progressing

## 2024-11-22 NOTE — PROGRESS NOTES
Department of Orthopedic Surgery  Resident Progress Note    Patient seen and examined at therapy this afternoon.  Patient states that her pain is well-controlled and she continues to work well with physical therapy.  She currently denies any new complaints.  She denies any new onset numbness, tingling, or paresthesias.  She denies any other orthopedic complaints this time.      VITALS:  BP (!) 131/91   Pulse 99   Temp 98.5 °F (36.9 °C) (Oral)   Resp 20   Ht 1.753 m (5' 9\")   Wt 80.7 kg (178 lb)   SpO2 95%   BMI 26.29 kg/m²     General: Alert and oriented x 3    MUSCULOSKELETAL:   right lower extremity:  Dressings are clean dry and intact without strikethrough  Compartments are soft and compressible  Motor intact ankle dorsiflexion, plantarflexion, EHL  Distal extremities are warm and well-perfused  Capillary refill <3 seconds  Sensation intact to sural, saphenous, tibial, peroneal nerve distributions      CBC:   Lab Results   Component Value Date/Time    WBC 4.7 11/22/2024 03:57 AM    HGB 9.1 11/22/2024 03:57 AM    HCT 26.3 11/22/2024 03:57 AM     11/22/2024 03:57 AM         ASSESSMENT  S/P right cephalomedullary nail on 11/21/2024    PLAN    Weightbearing as tolerated right lower extremity  DVT prophylaxis continue Lovenox as prescribed  Multimodal pain control, wean narcotics as able  Aquacel dressing protocol  Physical therapy weightbearing as tolerated right lower extremity  Follow-up in orthopedic office 2 weeks from date of surgery  Patient is stable for discharge from orthopedic perspective, orthopedics will sign off at this time  All of the patient's questions were sought and answered patient is currently agreeable to plan      Timothy Hallman DO, PGY1  Orthopaedic Surgery

## 2024-11-22 NOTE — PROGRESS NOTES
ascended and descended   Not assessed  2 x 7 steps, bilateral rail, Supervision    cues for sequencing and safety     3 steps, with rail, Supervision        ROM Within functional limits    Increase range of motion 10% of affected joints    Strength BUE:  refer to OT eval  RLE:  3/5  LLE:  4/5  Increase strength in affected mm groups by 1/3 grade   Balance Sitting EOB:  good - offloads right hip  Dynamic Standing:  fair wheeled walker  Sitting EOB: good   Dynamic Standing: fair with wheeled walker   Sitting EOB:  good    Dynamic Standing: good -wheeled walker      Patient is Alert & Oriented x person, place, time, and situation and follows directions    Sensation:  Patient  denies numbness/tingling   Edema:  none noted   Endurance: fair  +    Vitals: room air   Blood Pressure at rest  Blood Pressure during session    Heart Rate at rest  Heart Rate during session     SPO2 at rest  SPO2 during session  %     Patient education  Patient educated on role of Physical Therapy, risks of immobility, safety and plan of care, energy conservation,  importance of mobility while in hospital , safety , and stair training      Patient response to education:   Pt verbalized understanding Pt demonstrated skill Pt requires further education in this area   Yes Partial Yes      Treatment:  Patient practiced and was instructed/facilitated in the following treatment: Patient assisted to edge of bed and stood to take steps to bedside commode. Patient assisted to standing to amb with wheelchair. Patient taken to perform stair training as noted above.   Patient returned to sitting in wheelchair.       Therapeutic Exercises:  not performed       At end of session, patient in wheelchair with OT present call light and phone within reach,  all lines and tubes intact, nursing notified.      Patient would benefit from continued skilled Physical Therapy to improve functional independence and quality of life.         Patient's/ family goals    home    Time in 120  Time out  149    Total Treatment Time  29 minutes      CPT codes:  Therapeutic activities (65272)   11 minutes  1 unit(s)  Gait Training (98563) 18 minutes 1 unit(s)    Juana Mark [facility]   #[id number]

## 2024-11-22 NOTE — PROGRESS NOTES
OCCUPATIONAL THERAPY TREATMENT NOTE    JUNIOR [facility]   [address] [facility]        Date:2024  Patient Name: Natasha Washburn  MRN: [id number]  : 1965  Room: [address] [place] [address]-     Evaluating OT: Astrid Sagastume OTR/L; [id number]      Referring Provider and Specific Provider Orders/Date:      24   OT eval and treat  Start:  24,   End:  24,   ONE TIME,   Standing Count:  1 Occurrences,   R        Last continued at transfer on  10:15 PM    Chinmay Sharp, DO       Placement Recommendation: HHOT        Diagnosis:   1. Closed fracture of right hip, initial encounter ([facility])    2. Intertrochanteric fracture of left hip, closed, initial encounter ([facility])         Surgery:  right cephalomedullary nail on 2024       Pertinent Medical History:       Past Medical History        Past Medical History:   Diagnosis Date    Anxiety      Hypertension      Nondependent alcohol abuse              Past Surgical History         Past Surgical History:   Procedure Laterality Date    BACK SURGERY        BREAST SURGERY              Precautions:  Fall Risk, WBAT: R LE d/t right cephalomedullary nail on 2024, 3L O2   Citals: 93-95% on 3L O2 via NC, HR 91       Assessment of current deficits:     [x] Functional mobility            [x]ADLs           [x] Strength                   []Cognition    [x] Functional transfers          [x] IADLs          [] Safety Awareness   [x]Endurance    [] Fine Coordination              [x] Balance      [] Vision/perception    []Sensation      []Gross Motor Coordination  [] ROM           [] Delirium                   [] Motor Control      OT PLAN OF CARE   OT POC based on physician orders, patient diagnosis and results of clinical assessment     Frequency/Duration 1-3 days/wk for 2 weeks PRN      Specific OT Treatment Interventions to include:   * Instruction/training on adapted ADL techniques and

## 2024-11-22 NOTE — PROGRESS NOTES
Department of Internal Medicine        CHIEF COMPLAINT:  R hip pain    Reason for Admission:  R hip fracture    HISTORY OF PRESENT ILLNESS:      The patient is a 59 y.o. female who presents with right hip pain.  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  Patient had immediate pain.  Patient was brought to the ED and x-ray showed a comminuted anterior trochanteric/proximal femur shaft fracture.  Patient sisters at the bedside and case discussed routine lab work was unremarkable but had a TSH 4.99 today.  Temperature was 98.3 with heart rate of 74 blood pressure 122/74.  O2 sat 97% on room air at rest.  Patient denies any history of coronary artery disease/CHF.  She denies history of chest pain, palpitation or syncope.    11/21/2024  Patient seen examined on MedSur floor.  Patient has expected postop discomfort.  Patient denies any chest pain, abdominal pain, nausea vomiting or unusual shortness of breath at rest.  Serum sodium 131 with BUN/creatinine 10/0.5.  Fasting blood sugar 128.  Transaminase normal with a total bili 1.5.  WBC 6.5 with hemoglobin 9.8.  Temperature is 98.1 with heart rate 80 and blood pressure 130/78.  O2 sat 98% on 4 L nasal cannula.  Patient will have her O2 saturations checked on room air at rest today.    11/22/2024  Patient seen examined on medical surgical floor.  Patient denies any problem chest pain, abdominal pain, nausea or vomiting, fever/chills or unusual shortness of breath.  Patient has expected postop discomfort serum sodium is down 129 with BUN/creatinine 11/0.6.  Fasting blood sugar 130.  Normal transaminase with total bili 1.4.  WBC 4.70, hemoglobin 9.1.  Temperature 98.5 with heart rate in 99 blood pressure ranges 110//90.  O2 sat 95% on room air at rest.  Case discussed with PT. Case discussed with  today about discharge planning.  Patient is stable for discharge home.    Past Medical History:     Social History Narrative    Not on file     Social Determinants of Health     Financial Resource Strain: Low Risk  (7/1/2024)    Overall Financial Resource Strain (CARDIA)     Difficulty of Paying Living Expenses: Not hard at all   Food Insecurity: No Food Insecurity (11/19/2024)    Hunger Vital Sign     Worried About Running Out of Food in the Last Year: Never true     Ran Out of Food in the Last Year: Never true   Transportation Needs: No Transportation Needs (11/19/2024)    PRAPARE - Transportation     Lack of Transportation (Medical): No     Lack of Transportation (Non-Medical): No   Physical Activity: Not on file   Stress: Not on file   Social Connections: Not on file   Intimate Partner Violence: Not on file   Housing Stability: Low Risk  (11/19/2024)    Housing Stability Vital Sign     Unable to Pay for Housing in the Last Year: No     Number of Times Moved in the Last Year: 0     Homeless in the Last Year: No       Family History:   History reviewed. No pertinent family history.    REVIEW OF SYSTEMS:    Gen: Patient denies any lightheadedness or dizziness.  No LOC or syncope.  No fevers or chills.    HEENT: No earache, sore throat or nasal congestion.    Resp: Denies cough, hemoptysis or sputum production.    Cardiac: Denies chest pain, SOB, diaphoresis or palpitations.    GI: No nausea, vomiting, diarrhea or constipation.  No melena or hematochezia.    : No urinary complaints, dysuria, hematuria or frequency.    MSK: + R hip pain. No extremity weakness, paralysis or paresthesias.     PHYSICAL EXAM:    Vitals:  BP (!) 131/91   Pulse 99   Temp 98.5 °F (36.9 °C) (Oral)   Resp 20   Ht 1.753 m (5' 9\")   Wt 80.7 kg (178 lb)   SpO2 95%   BMI 26.29 kg/m²     General:  This is a 59 y.o. yo female who is alert and oriented in NAD  HEENT:  Head is normocephalic and atraumatic, PERRLA, EOMI, mucus membranes moist with no pharyngeal erythema or exudate.  Neck:  Supple with no carotid bruits, JVD or

## 2024-11-22 NOTE — PROGRESS NOTES
Physical Therapy Treatment Note/Plan of Care    Room #:  0324/0324-02  Patient Name: Natasha Washburn  YOB: 1965  MRN: [id number]    Date of Service: 11/22/2024     Tentative placement recommendation: Home with Home Health Physical Therapy and with supervision/family assist  Equipment recommendation: Wheeled Walker, Bedside commode, Hospital bed      Evaluating Physical Therapist: Niyah Hale, PT #[id number]      Specific Provider Orders/Date/Referring Provider :  11/19/24 1730    PT eval and treat  Start:  11/19/24 1730,   End:  11/19/24 1730,   ONE TIME,   Standing Count:  1 Occurrences,   R       Last continued at transfer on Wed Nov 20, 2024  9:06 PM  Chinmay Sharp DO    Admitting Diagnosis:   Closed fracture of right hip, initial encounter ([facility]) [S72.001A]  Intertrochanteric fracture of left hip, closed, initial encounter ([facility]) [S72.142A]     She went to go backwards but the desks were in her way and she fell striking her right hip.   Surgery:   Date of Procedure: 11/20/2024     Pre-op diagnosis: Right hip intertrochanteric hip fracture     Post-Op Diagnosis: Same       Procedure:  Right Hip intramedullary nailing     Surgeon(s):  Evin Thrasher DO     Visit Diagnoses         Codes    Closed fracture of right hip, initial encounter ([facility])    -  Primary S72.001A            Patient Active Problem List   Diagnosis    Essential hypertension    Intertrochanteric fracture of left hip, closed, initial encounter ([facility])        ASSESSMENT of Current Deficits Patient exhibits decreased strength, balance, and endurance impairing functional mobility, transfers, gait , gait distance, and tolerance to activity Patient improved with overall tolerance to function and required decreased assist level today. Patient performed bed mobility with min assist, transfers as supervision and short ambulation with supervision. Patient did become diaphoretic during ambulation, needing a seated rest break. Patient has    How much help is needed turning from your back to your side while in a flat bed without using bedrails?: A Little  How much help is needed moving from lying on your back to sitting on the side of a flat bed without using bedrails?: A Little  How much help is needed moving to and from a bed to a chair?: A Little  How much help is needed standing up from a chair using your arms?: A Little  How much help is needed walking in hospital room?: A Little  How much help is needed climbing 3-5 steps with a railing?: A Lot  AM-PAC Inpatient Mobility Raw Score : 17  AM-PAC Inpatient T-Scale Score : 42.13  Mobility Inpatient CMS 0-100% Score: 50.57  Mobility Inpatient CMS G-Code Modifier : CK    Nursing cleared patient for PT treatment.      OBJECTIVE:   Initial Evaluation  Date: 11/21/2024 Treatment Date:   11/22/2024  Short Term/ Long Term   Goals   Was pt agreeable to Eval/treatment? Yes yes To be met in 3 days   Pain level   6/10  Right lower extremity  5/10 R hip    Bed Mobility  Using rails and head of bed elevated:       Rolling: Not assessed     Supine to sit: Moderate assist of 1    Sit to supine: Not assessed     Scooting: Moderate assist of 1   Using rails and head of bed elevated:     Rolling: Supervision    Supine to sit: Minimal assist of 1   Sit to supine: Not assessed patient in chair   Scooting: Supervision     Rolling: Independent    Supine to sit: Independent    Sit to supine: Independent    Scooting: Independent     Transfers Sit to stand: Moderate assist of 1 Cues for hand placement and safety  Sit to stand: Supervision  with bed elevated and from chair, cues for safety   Sit to stand: Modified Independent     Ambulation     30 feet using  wheeled walker with Minimal assist of 1   for walker control and balance, Patient with antalgic gait right lower extremity, step to gait pattern, and difficulty advancing right lower extremity, and cues for sequencing, upright posture, and safety 3-4 steps, 30 feet using

## 2024-11-23 VITALS
OXYGEN SATURATION: 94 % | HEART RATE: 97 BPM | RESPIRATION RATE: 16 BRPM | HEIGHT: 69 IN | WEIGHT: 178 LBS | BODY MASS INDEX: 26.36 KG/M2 | TEMPERATURE: 98.6 F | DIASTOLIC BLOOD PRESSURE: 60 MMHG | SYSTOLIC BLOOD PRESSURE: 116 MMHG

## 2024-11-23 LAB
ALBUMIN SERPL-MCNC: 3.1 G/DL (ref 3.5–5.2)
ALP SERPL-CCNC: 61 U/L (ref 35–104)
ALT SERPL-CCNC: 23 U/L (ref 0–32)
ANION GAP SERPL CALCULATED.3IONS-SCNC: 8 MMOL/L (ref 7–16)
AST SERPL-CCNC: 34 U/L (ref 0–31)
BASOPHILS # BLD: 0.04 K/UL (ref 0–0.2)
BASOPHILS NFR BLD: 1 % (ref 0–2)
BILIRUB SERPL-MCNC: 0.9 MG/DL (ref 0–1.2)
BUN SERPL-MCNC: 13 MG/DL (ref 6–20)
CALCIUM SERPL-MCNC: 8.5 MG/DL (ref 8.6–10.2)
CHLORIDE SERPL-SCNC: 94 MMOL/L (ref 98–107)
CO2 SERPL-SCNC: 27 MMOL/L (ref 22–29)
CREAT SERPL-MCNC: 0.6 MG/DL (ref 0.5–1)
EOSINOPHIL # BLD: 0.15 K/UL (ref 0.05–0.5)
EOSINOPHILS RELATIVE PERCENT: 3 % (ref 0–6)
ERYTHROCYTE [DISTWIDTH] IN BLOOD BY AUTOMATED COUNT: 13.4 % (ref 11.5–15)
GFR, ESTIMATED: >90 ML/MIN/1.73M2
GLUCOSE SERPL-MCNC: 118 MG/DL (ref 74–99)
HCT VFR BLD AUTO: 27.5 % (ref 34–48)
HGB BLD-MCNC: 9.3 G/DL (ref 11.5–15.5)
IMM GRANULOCYTES # BLD AUTO: 0.04 K/UL (ref 0–0.58)
IMM GRANULOCYTES NFR BLD: 1 % (ref 0–5)
LYMPHOCYTES NFR BLD: 0.83 K/UL (ref 1.5–4)
LYMPHOCYTES RELATIVE PERCENT: 15 % (ref 20–42)
MCH RBC QN AUTO: 31.1 PG (ref 26–35)
MCHC RBC AUTO-ENTMCNC: 33.8 G/DL (ref 32–34.5)
MCV RBC AUTO: 92 FL (ref 80–99.9)
MONOCYTES NFR BLD: 0.54 K/UL (ref 0.1–0.95)
MONOCYTES NFR BLD: 10 % (ref 2–12)
NEUTROPHILS NFR BLD: 71 % (ref 43–80)
NEUTS SEG NFR BLD: 3.86 K/UL (ref 1.8–7.3)
PLATELET # BLD AUTO: 187 K/UL (ref 130–450)
PMV BLD AUTO: 9 FL (ref 7–12)
POTASSIUM SERPL-SCNC: 4.1 MMOL/L (ref 3.5–5)
PROT SERPL-MCNC: 5.7 G/DL (ref 6.4–8.3)
RBC # BLD AUTO: 2.99 M/UL (ref 3.5–5.5)
SODIUM SERPL-SCNC: 129 MMOL/L (ref 132–146)
WBC OTHER # BLD: 5.5 K/UL (ref 4.5–11.5)

## 2024-11-23 PROCEDURE — 97530 THERAPEUTIC ACTIVITIES: CPT

## 2024-11-23 PROCEDURE — 85025 COMPLETE CBC W/AUTO DIFF WBC: CPT

## 2024-11-23 PROCEDURE — 6370000000 HC RX 637 (ALT 250 FOR IP): Performed by: ORTHOPAEDIC SURGERY

## 2024-11-23 PROCEDURE — 80053 COMPREHEN METABOLIC PANEL: CPT

## 2024-11-23 PROCEDURE — 97116 GAIT TRAINING THERAPY: CPT

## 2024-11-23 PROCEDURE — 36415 COLL VENOUS BLD VENIPUNCTURE: CPT

## 2024-11-23 PROCEDURE — 6360000002 HC RX W HCPCS: Performed by: ORTHOPAEDIC SURGERY

## 2024-11-23 PROCEDURE — 6370000000 HC RX 637 (ALT 250 FOR IP): Performed by: INTERNAL MEDICINE

## 2024-11-23 RX ADMIN — ENOXAPARIN SODIUM 40 MG: 100 INJECTION SUBCUTANEOUS at 10:06

## 2024-11-23 RX ADMIN — OXYCODONE HYDROCHLORIDE AND ACETAMINOPHEN 1 TABLET: 5; 325 TABLET ORAL at 00:24

## 2024-11-23 RX ADMIN — AMLODIPINE BESYLATE 5 MG: 5 TABLET ORAL at 10:06

## 2024-11-23 RX ADMIN — OXYCODONE HYDROCHLORIDE AND ACETAMINOPHEN 1 TABLET: 5; 325 TABLET ORAL at 10:40

## 2024-11-23 RX ADMIN — OXYCODONE HYDROCHLORIDE AND ACETAMINOPHEN 1 TABLET: 5; 325 TABLET ORAL at 06:34

## 2024-11-23 RX ADMIN — CITALOPRAM HYDROBROMIDE 30 MG: 10 TABLET ORAL at 12:19

## 2024-11-23 ASSESSMENT — PAIN SCALES - GENERAL
PAINLEVEL_OUTOF10: 5
PAINLEVEL_OUTOF10: 6
PAINLEVEL_OUTOF10: 5

## 2024-11-23 ASSESSMENT — PAIN DESCRIPTION - LOCATION
LOCATION: HIP

## 2024-11-23 ASSESSMENT — PAIN DESCRIPTION - ORIENTATION
ORIENTATION: RIGHT
ORIENTATION: RIGHT;UPPER
ORIENTATION: RIGHT

## 2024-11-23 ASSESSMENT — PAIN DESCRIPTION - DESCRIPTORS
DESCRIPTORS: ACHING;THROBBING;DISCOMFORT
DESCRIPTORS: THROBBING
DESCRIPTORS: ACHING;SHARP;DISCOMFORT

## 2024-11-23 ASSESSMENT — PAIN - FUNCTIONAL ASSESSMENT
PAIN_FUNCTIONAL_ASSESSMENT: ACTIVITIES ARE NOT PREVENTED
PAIN_FUNCTIONAL_ASSESSMENT: ACTIVITIES ARE NOT PREVENTED

## 2024-11-23 NOTE — PROGRESS NOTES
Physical Therapy Treatment Note/Plan of Care    Room #:  0324/0324-02  Patient Name: Natasha Washburn  YOB: 1965  MRN: [id number]    Date of Service: 11/23/2024     Tentative placement recommendation: Home with Home Health Physical Therapy and with supervision/family assist  Equipment recommendation: Wheeled Walker, Bedside commode, Hospital bed      Evaluating Physical Therapist: Niyah Hale, PT #[id number]      Specific Provider Orders/Date/Referring Provider :  11/19/24 1730    PT eval and treat  Start:  11/19/24 1730,   End:  11/19/24 1730,   ONE TIME,   Standing Count:  1 Occurrences,   R       Last continued at transfer on Wed Nov 20, 2024  9:06 PM  Chinmay Sharp DO    Admitting Diagnosis:   Closed fracture of right hip, initial encounter ([facility]) [S72.001A]  Intertrochanteric fracture of left hip, closed, initial encounter ([facility]) [S72.142A]     She went to go backwards but the desks were in her way and she fell striking her right hip.   Surgery:   Date of Procedure: 11/20/2024     Pre-op diagnosis: Right hip intertrochanteric hip fracture     Post-Op Diagnosis: Same       Procedure:  Right Hip intramedullary nailing     Surgeon(s):  Evin Thrasher DO     Visit Diagnoses         Codes    Closed fracture of right hip, initial encounter ([facility])    -  Primary S72.001A            Patient Active Problem List   Diagnosis    Essential hypertension    Intertrochanteric fracture of left hip, closed, initial encounter ([facility])        ASSESSMENT of Current Deficits Patient exhibits decreased strength, balance, and endurance impairing functional mobility, transfers, gait , gait distance, and tolerance to activity Patient able to perform transfers, ambulation and stair training with supervision. Pt able to don pants for discharge with assistance then ambulated to bed. Pt required Polly sit to supine for right LE movement, otherwise supervision for all other bed mobility. Patient with good awareness. Patient  to sitting on the side of a flat bed without using bedrails?: A Little  How much help is needed moving to and from a bed to a chair?: A Little  How much help is needed standing up from a chair using your arms?: A Little  How much help is needed walking in hospital room?: A Little  How much help is needed climbing 3-5 steps with a railing?: A Little  AM-PAC Inpatient Mobility Raw Score : 18  AM-PAC Inpatient T-Scale Score : 43.63  Mobility Inpatient CMS 0-100% Score: 46.58  Mobility Inpatient CMS G-Code Modifier : CK    Nursing cleared patient for PT treatment.    Pt c/o increased swelling and tightness in right hip/ RLE today but otherwise she is motivated to go home  OBJECTIVE:   Initial Evaluation  Date: 11/21/2024 Treatment Date:   11/23/2024  Short Term/ Long Term   Goals   Was pt agreeable to Eval/treatment? Yes yes To be met in 3 days   Pain level   6/10  Right lower extremity  6/10 right LE    Bed Mobility  Using rails and head of bed elevated:       Rolling: Not assessed     Supine to sit: Moderate assist of 1    Sit to supine: Not assessed     Scooting: Moderate assist of 1   Using rails and head of bed elevated:     Rolling: Supervision    Supine to sit: Not assessed patient in chair   Sit to supine: Minimal assist of 1   Scooting: Supervision     Rolling: Independent    Supine to sit: Independent    Sit to supine: Independent    Scooting: Independent     Transfers Sit to stand: Moderate assist of 1 Cues for hand placement and safety  Sit to stand: Supervision    X 4   Sit to stand: Modified Independent     Ambulation     30 feet using  wheeled walker with Minimal assist of 1   for walker control and balance, Patient with antalgic gait right lower extremity, step to gait pattern, and difficulty advancing right lower extremity, and cues for sequencing, upright posture, and safety 1 x 20 ft, 1 x 5 feet using  wheeled walker with Supervision   . Pt performed sequencing, walker approximation and pacing without

## 2024-11-23 NOTE — PLAN OF CARE
Problem: Discharge Planning  Goal: Discharge to home or other facility with appropriate resources  11/22/2024 2108 by Nelly Smith RN  Outcome: Progressing     Problem: Safety - Adult  Goal: Free from fall injury  11/22/2024 2108 by Nelly Smith RN  Outcome: Progressing     Problem: ABCDS Injury Assessment  Goal: Absence of physical injury  11/22/2024 2108 by Nelly Smith RN  Outcome: Progressing     Problem: Pain  Goal: Verbalizes/displays adequate comfort level or baseline comfort level  11/22/2024 2108 by Nelly Smith RN  Outcome: Progressing

## 2024-11-23 NOTE — DISCHARGE SUMMARY
Department of Internal Medicine        CHIEF COMPLAINT:  R hip pain    Reason for Admission:  R hip fracture    HISTORY OF PRESENT ILLNESS:      The patient is a 59 y.o. female who presents with right hip pain.  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  Patient had immediate pain.  Patient was brought to the ED and x-ray showed a comminuted anterior trochanteric/proximal femur shaft fracture.  Patient sisters at the bedside and case discussed routine lab work was unremarkable but had a TSH 4.99 today.  Temperature was 98.3 with heart rate of 74 blood pressure 122/74.  O2 sat 97% on room air at rest.  Patient denies any history of coronary artery disease/CHF.  She denies history of chest pain, palpitation or syncope.    11/21/2024  Patient seen examined on MedSur floor.  Patient has expected postop discomfort.  Patient denies any chest pain, abdominal pain, nausea vomiting or unusual shortness of breath at rest.  Serum sodium 131 with BUN/creatinine 10/0.5.  Fasting blood sugar 128.  Transaminase normal with a total bili 1.5.  WBC 6.5 with hemoglobin 9.8.  Temperature is 98.1 with heart rate 80 and blood pressure 130/78.  O2 sat 98% on 4 L nasal cannula.  Patient will have her O2 saturations checked on room air at rest today.    11/22/2024  Patient seen examined on medical surgical floor.  Patient denies any problem chest pain, abdominal pain, nausea or vomiting, fever/chills or unusual shortness of breath.  Patient has expected postop discomfort serum sodium is down 129 with BUN/creatinine 11/0.6.  Fasting blood sugar 130.  Normal transaminase with total bili 1.4.  WBC 4.70, hemoglobin 9.1.  Temperature 98.5 with heart rate in 99 blood pressure ranges 110//90.  O2 sat 95% on room air at rest.  Case discussed with PT. Case discussed with  today about discharge planning.  Patient is stable for discharge home.    11/23/2024  Patient seen  hip femur fracture from a fall- right hip intramedullary nailing on 11/20/2024  HTN  Anxiety  TSH 4.99, free T4 is 1.3  Acute normocytic anemia postop  Hypotonic euvolemic hyponatremia-probably related to surgery    Plan:  Discharge home     Aspirin 325 mg enteric-coated twice daily x 28 days  Percocet 5 mg tablet 1 every 6 hours as needed x 7 days    Continue home meds but hold Cozaar     Follow-up with orthopedics as directed    Follow-up PCP in 1 week to reevaluate blood pressure medicine and Cozaar    Recommend repeat BMP, CBC 2 weeks      Dayron Dunne DO, D.O.  11/23/2024  9:31 AM

## 2025-01-15 ENCOUNTER — OFFICE VISIT (OUTPATIENT)
Dept: ORTHOPEDIC SURGERY | Age: 60
End: 2025-01-15
Payer: COMMERCIAL

## 2025-01-15 ENCOUNTER — TELEPHONE (OUTPATIENT)
Dept: ORTHOPEDIC SURGERY | Age: 60
End: 2025-01-15

## 2025-01-15 ENCOUNTER — HOSPITAL ENCOUNTER (OUTPATIENT)
Dept: GENERAL RADIOLOGY | Age: 60
Discharge: HOME | End: 2025-01-17
Payer: COMMERCIAL

## 2025-01-15 ENCOUNTER — HOSPITAL ENCOUNTER (OUTPATIENT)
Dept: CT IMAGING | Age: 60
Discharge: HOME | End: 2025-01-15
Payer: COMMERCIAL

## 2025-01-15 ENCOUNTER — PREP FOR PROCEDURE (OUTPATIENT)
Dept: ORTHOPEDIC SURGERY | Age: 60
End: 2025-01-15

## 2025-01-15 VITALS
BODY MASS INDEX: 26.35 KG/M2 | OXYGEN SATURATION: 99 % | WEIGHT: 177.91 LBS | DIASTOLIC BLOOD PRESSURE: 82 MMHG | TEMPERATURE: 99.2 F | HEART RATE: 88 BPM | SYSTOLIC BLOOD PRESSURE: 138 MMHG | RESPIRATION RATE: 20 BRPM | HEIGHT: 69 IN

## 2025-01-15 DIAGNOSIS — S72.142A INTERTROCHANTERIC FRACTURE OF LEFT HIP, CLOSED, INITIAL ENCOUNTER (HCC): Primary | ICD-10-CM

## 2025-01-15 DIAGNOSIS — S72.142A INTERTROCHANTERIC FRACTURE OF LEFT HIP, CLOSED, INITIAL ENCOUNTER (HCC): ICD-10-CM

## 2025-01-15 DIAGNOSIS — T84.84XA PAINFUL ORTHOPAEDIC HARDWARE: ICD-10-CM

## 2025-01-15 DIAGNOSIS — S72.144D CLOSED NONDISPLACED INTERTROCHANTERIC FRACTURE OF RIGHT FEMUR WITH ROUTINE HEALING, SUBSEQUENT ENCOUNTER: Primary | ICD-10-CM

## 2025-01-15 DIAGNOSIS — Z96.698 PRESENCE OF OTHER ORTHOPEDIC JOINT IMPLANTS: ICD-10-CM

## 2025-01-15 DIAGNOSIS — S72.144D CLOSED NONDISPLACED INTERTROCHANTERIC FRACTURE OF RIGHT FEMUR WITH ROUTINE HEALING, SUBSEQUENT ENCOUNTER: ICD-10-CM

## 2025-01-15 PROBLEM — S72.141A CLOSED INTERTROCHANTERIC FRACTURE OF RIGHT FEMUR (HCC): Status: ACTIVE | Noted: 2025-01-15

## 2025-01-15 PROBLEM — T85.698A MECHANICAL COMPLICATION DUE TO IMPLANT AND INTERNAL DEVICE: Status: ACTIVE | Noted: 2025-01-15

## 2025-01-15 PROCEDURE — 73552 X-RAY EXAM OF FEMUR 2/>: CPT

## 2025-01-15 PROCEDURE — 99204 OFFICE O/P NEW MOD 45 MIN: CPT | Performed by: ORTHOPAEDIC SURGERY

## 2025-01-15 PROCEDURE — 73700 CT LOWER EXTREMITY W/O DYE: CPT

## 2025-01-15 ASSESSMENT — HOOS JR
HOOS JR RAW SCORE: 12
LYING IN BED (TURNING OVER, MAINTAINING HIP POSITION): MODERATE
HOOS JR RAW SCORE: 12
SITTING: MODERATE
HOOS JR TOTAL INTERVAL SCORE: 52.965
RISING FROM SITTING: SEVERE
WALKING ON UNEVEN SURFACE: MODERATE
GOING UP OR DOWN STAIRS: MILD
BENDING TO THE FLOOR TO PICK UP OBJECT: MODERATE

## 2025-01-15 ASSESSMENT — PROMIS GLOBAL HEALTH SCALE
IN GENERAL, PLEASE RATE HOW WELL YOU CARRY OUT YOUR USUAL SOCIAL ACTIVITIES (INCLUDES ACTIVITIES AT HOME, AT WORK, AND IN YOUR COMMUNITY, AND RESPONSIBILITIES AS A PARENT, CHILD, SPOUSE, EMPLOYEE, FRIEND, ETC) [ON A SCALE OF 1 (POOR) TO 5 (EXCELLENT)]?: VERY GOOD
IN GENERAL, HOW WOULD YOU RATE YOUR SATISFACTION WITH YOUR SOCIAL ACTIVITIES AND RELATIONSHIPS [ON A SCALE OF 1 (POOR) TO 5 (EXCELLENT)]?: VERY GOOD
IN GENERAL, HOW WOULD YOU RATE YOUR MENTAL HEALTH, INCLUDING YOUR MOOD AND YOUR ABILITY TO THINK [ON A SCALE OF 1 (POOR) TO 5 (EXCELLENT)]?: GOOD
WHO IS THE PERSON COMPLETING THE PROMIS V1.1 SURVEY?: SELF
SUM OF RESPONSES TO QUESTIONS 3, 6, 7, & 8: 16
IN GENERAL, HOW WOULD YOU RATE YOUR PHYSICAL HEALTH [ON A SCALE OF 1 (POOR) TO 5 (EXCELLENT)]?: VERY GOOD
IN THE PAST 7 DAYS, HOW WOULD YOU RATE YOUR PAIN ON AVERAGE [ON A SCALE FROM 0 (NO PAIN) TO 10 (WORST IMAGINABLE PAIN)]?: 5
IN THE PAST 7 DAYS, HOW OFTEN HAVE YOU BEEN BOTHERED BY EMOTIONAL PROBLEMS, SUCH AS FEELING ANXIOUS, DEPRESSED, OR IRRITABLE [ON A SCALE FROM 1 (NEVER) TO 5 (ALWAYS)]?: SOMETIMES
IN THE PAST 7 DAYS, HOW WOULD YOU RATE YOUR FATIGUE ON AVERAGE [ON A SCALE FROM 1 (NONE) TO 5 (VERY SEVERE)]?: MILD
IN GENERAL, WOULD YOU SAY YOUR QUALITY OF LIFE IS...[ON A SCALE OF 1 (POOR) TO 5 (EXCELLENT)]: VERY GOOD
HOW IS THE PROMIS V1.1 BEING ADMINISTERED?: ELECTRONIC
TO WHAT EXTENT ARE YOU ABLE TO CARRY OUT YOUR EVERYDAY PHYSICAL ACTIVITIES SUCH AS WALKING, CLIMBING STAIRS, CARRYING GROCERIES, OR MOVING A CHAIR [ON A SCALE OF 1 (NOT AT ALL) TO 5 (COMPLETELY)]?: MODERATELY
IN GENERAL, WOULD YOU SAY YOUR HEALTH IS...[ON A SCALE OF 1 (POOR) TO 5 (EXCELLENT)]: GOOD
SUM OF RESPONSES TO QUESTIONS 2, 4, 5, & 10: 14

## 2025-01-15 NOTE — PATIENT INSTRUCTIONS
sources: Meat, fish, milk, cheese, cottage cheese, yogurt, nuts, seeds, beans, soy products, and fortified cereals.    Calcium  This mineral also helps you build strong bones, so foods and drinks rich in it can help your bone fracture heal. Adults should get between 1,000 and 1,200 milligrams of calcium each day. Your doctor will tell you if you need a calcium supplement, and what amount you should take if you do.    Good sources: Milk, yogurt, cheese, cottage cheese, broccoli, turnip or luciana greens, kale, bok sanjay, soy, beans, canned tuna or salmon with bones, almond milk, and fortified cereals or juice.    Vitamin D  This vitamin should be a part of your diet to help your fracture heal. It helps your blood take in and use calcium and build up the minerals in your bones.    You get some vitamin D when sunlight hits your skin, so it can be a good idea to spend a short amount of time outdoors each day -- 15 minutes may be enough for a fair-skinned person.     Vitamin D is found naturally in only a few foods like egg yolks and fatty fish, but manufacturers add it to other foods, like milk or orange juice. Adults should get at least 600 IU of vitamin D every day, and if you're over 70 you should get at least 800 IU.    Good sources: Swordfish, salmon, cod liver oil, sardines, liver, fortified milk or yogurt, egg yolks, and fortified orange juice.    Vitamin C  Collagen is a protein that's an important building block for bone. Vitamin C helps your body make collagen, which helps your bone fracture heal. You can get it from many tasty, fresh fruits and veggies. Aged or heated produce can lose some of its vitamin C, so go for fresh or frozen.    Good sources: Citrus fruits like oranges, kiwi fruit, berries, tomatoes, peppers, potatoes, and green vegetables.    Iron  If you have iron-deficiency anemia -- when you don't have enough healthy red blood cells -- you may heal more slowly after a fracture. Iron helps your body

## 2025-01-15 NOTE — PROGRESS NOTES
Chief Complaint   Patient presents with    Follow-up     WC NTP: GP 2/15/25, Right IT Hip Fx, hardware failure DOS 11/20/24,. Patient states pain lvl 5       SUBJECTIVE: Patient is a pleasant 59-year-old female who comes in today with right groin pain.  Patient had an intertrochanteric/peritrochanteric pattern hip fracture fixed on 11/20/2024 by another surgeon.  Patient fairly uncomplicated hospital course until her most recent visit where she developed groin pain.  It was noted on imaging the patient had cut her screw through the femoral head and then she had a paucity of healing.  On her general laboratory workup she does have low total protein at 5.7 she also has low calcium at 8.5.  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  She denies any smoking, does not eat a large amount of protein, and does admit to drinking alcohol although she states that is occasionally.  Patient was scheduled to get a left total knee done in the spring which is currently moved to June 4 did explain to her that this may be delayed due to this.          Past Medical History:   Diagnosis Date    Anxiety     Hypertension     Nondependent alcohol abuse        Past Surgical History:   Procedure Laterality Date    BACK SURGERY      BREAST SURGERY      FEMUR SURGERY Right 11/20/2024    RIGHT FEMUR OPEN REDUCTION INTERNAL FIXATION performed by Evin Thrasher DO at [facility] OR       History reviewed. No pertinent family history.    Social History     Tobacco Use    Smoking status: Never    Smokeless tobacco: Never   Vaping Use    Vaping status: Never Used   Substance Use Topics    Alcohol use: Never    Drug use: No       Allergies   Allergen Reactions    Erythromycin     Sulfa Antibiotics Nausea And Vomiting         Review of Systems   Constitutional: Negative for fever, chills, diaphoresis, appetite change and fatigue.   HENT: Negative for dental issues, hearing loss and tinnitus.

## 2025-01-15 NOTE — TELEPHONE ENCOUNTER
Prior Authorization Form:    DEMOGRAPHICS:                     Patient Name:  Natasha Washburn  Patient :  1965        DIAGNOSIS & PROCEDURE:                       Procedure/Operation: Removal of Current Right Femur Orthopedic Hardware, Conversion to Right SARAH  (need Restoration Modular)           CPT Code: 17736    Diagnosis:  Closed Intertrochanteric Fracture of Right Hip, Failure of Orthopedic Hardware, S/P Right Femur IT Fracture ORIF, Painful Orthopedic Hardware    ICD10 Code: S72.141A, T85.698, Z96.698, T84.84XA    [facility]  [facility]    Surgeon:  Dr. Len Garcia MD    SCHEDULING INFORMATION:                          Date: 2025    Time: 9:00 am               Anesthesia:  General                                                        Status: Inpatient    Special Comments:         Vendor:  [employer] needed to remove the current Orthopedic Hardware in Right Femur , Amy needed for Right SARAH   [x]   Notified David with Amy on 2025 KLF    Electronically signed by Jennifer Sung MA on 1/15/2025 at 4:03 PM

## 2025-01-15 NOTE — H&P (VIEW-ONLY)
Chief Complaint   Patient presents with    Follow-up     WC NTP: GP 2/15/25, Right IT Hip Fx, hardware failure DOS 11/20/24,. Patient states pain lvl 5       SUBJECTIVE: Patient is a pleasant 59-year-old female who comes in today with right groin pain.  Patient had an intertrochanteric/peritrochanteric pattern hip fracture fixed on 11/20/2024 by another surgeon.  Patient fairly uncomplicated hospital course until her most recent visit where she developed groin pain.  It was noted on imaging the patient had cut her screw through the femoral head and then she had a paucity of healing.  On her general laboratory workup she does have low total protein at 5.7 she also has low calcium at 8.5.  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  She denies any smoking, does not eat a large amount of protein, and does admit to drinking alcohol although she states that is occasionally.  Patient was scheduled to get a left total knee done in the spring which is currently moved to June 4 did explain to her that this may be delayed due to this.          Past Medical History:   Diagnosis Date    Anxiety     Hypertension     Nondependent alcohol abuse        Past Surgical History:   Procedure Laterality Date    BACK SURGERY      BREAST SURGERY      FEMUR SURGERY Right 11/20/2024    RIGHT FEMUR OPEN REDUCTION INTERNAL FIXATION performed by Evin Thrasher DO at [facility] OR       History reviewed. No pertinent family history.    Social History     Tobacco Use    Smoking status: Never    Smokeless tobacco: Never   Vaping Use    Vaping status: Never Used   Substance Use Topics    Alcohol use: Never    Drug use: No       Allergies   Allergen Reactions    Erythromycin     Sulfa Antibiotics Nausea And Vomiting         Review of Systems   Constitutional: Negative for fever, chills, diaphoresis, appetite change and fatigue.   HENT: Negative for dental issues, hearing loss and tinnitus.

## 2025-01-16 RX ORDER — LOSARTAN POTASSIUM 50 MG/1
50 TABLET ORAL DAILY
COMMUNITY
End: 2025-01-17 | Stop reason: SDUPTHER

## 2025-01-16 NOTE — PROGRESS NOTES
[facility]   PRE-ADMISSION TESTING GENERAL INSTRUCTIONS  PAT Phone Number: [phone]      GENERAL INSTRUCTIONS:    [x] The Night Before Surgery: Take an antibacterial soap shower - followed by CHG Wipes.   -The Morning of Surgery: Repeat CHG Wipes.  [x] Do not wear makeup, lotions, powders, deodorant the morning of surgery.  [x] No SOLID foods after midnight. You may have CLEAR liquids up to 2 hours before your surgery.  [x] You may brush your teeth, gargle, but do NOT swallow water.   [x] No tobacco products, illegal drugs, or alcohol within 24 hours of your surgery.  [x] Jewelry or valuables should not be brought to the hospital. All body and/or tongue piercing's must be removed prior to arriving to hospital. No contact lens or hair pins.   [x] Arrange transportation with a responsible adult  to and from the hospital. Arrange for someone to be with you for the remainder of the day and for 24 hours after your procedure due to having had anesthesia.          -Who will be your  for transportation?   [x] Bring insurance card and photo ID.  [x] Bring copy of living will or healthcare power of  papers to be placed in your electronic record.  [x] Transfusion (Green) Bracelet: Please bring with you to hospital, day of surgery.     PARKING INSTRUCTIONS:     [x] ARRIVAL DATE & TIME: Monday 1/27 at 7:30 am  [x] Times are subject to change. We will contact you the business day before surgery if that were to occur.  [x] Enter into the [facility] Entrance. Two people may accompany you. Masks are not required.  [x] Parking Lot \"I\" is where you will park. It is located on the corner of [address] and [address]. The entrance is on [address].   Only one vehicle - per patient, is permitted in parking lot.   Upon entering the parking lot, a voucher ticket will print.    EDUCATION INSTRUCTIONS:           [x] Pre-admission Testing educational folder

## 2025-01-16 NOTE — PROGRESS NOTES
Deanna Rehman (orthopedic navigator) notified of patient's surgery on 1/27.  Also notified patient not able to attend joint class prior to surgery.  Deanna Rehman given patient's phone number.  Deanna stated that she would call the patient prior to surgery and review the information covered in joint class.  I spoke to the patient and notified her that Deanna Rehman would be calling her.

## 2025-01-17 RX ORDER — SODIUM CHLORIDE 9 MG/ML
INJECTION, SOLUTION INTRAVENOUS PRN
Status: CANCELLED | OUTPATIENT
Start: 2025-01-17

## 2025-01-17 RX ORDER — SODIUM CHLORIDE 0.9 % (FLUSH) 0.9 %
5-40 SYRINGE (ML) INJECTION EVERY 12 HOURS SCHEDULED
Status: CANCELLED | OUTPATIENT
Start: 2025-01-17

## 2025-01-17 RX ORDER — SODIUM CHLORIDE 0.9 % (FLUSH) 0.9 %
5-40 SYRINGE (ML) INJECTION PRN
Status: CANCELLED | OUTPATIENT
Start: 2025-01-17

## 2025-01-17 NOTE — H&P
Chief Complaint   Patient presents with    Follow-up       WC NTP: GP 2/15/25, Right IT Hip Fx, hardware failure DOS 11/20/24,. Patient states pain lvl 5         SUBJECTIVE: Patient is a pleasant 59-year-old female who comes in today with right groin pain.  Patient had an intertrochanteric/peritrochanteric pattern hip fracture fixed on 11/20/2024 by another surgeon.  Patient fairly uncomplicated hospital course until her most recent visit where she developed groin pain.  It was noted on imaging the patient had cut her screw through the femoral head and then she had a paucity of healing.  On her general laboratory workup she does have low total protein at 5.7 she also has low calcium at 8.5.  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  She denies any smoking, does not eat a large amount of protein, and does admit to drinking alcohol although she states that is occasionally.  Patient was scheduled to get a left total knee done in the spring which is currently moved to June 4 did explain to her that this may be delayed due to this.              Past Medical History        Past Medical History:   Diagnosis Date    Anxiety      Hypertension      Nondependent alcohol abuse              Past Surgical History         Past Surgical History:   Procedure Laterality Date    BACK SURGERY        BREAST SURGERY        FEMUR SURGERY Right 11/20/2024     RIGHT FEMUR OPEN REDUCTION INTERNAL FIXATION performed by Evin Thrasher DO at [facility] OR            Family History   History reviewed. No pertinent family history.        Social History   Social History           Tobacco Use    Smoking status: Never    Smokeless tobacco: Never   Vaping Use    Vaping status: Never Used   Substance Use Topics    Alcohol use: Never    Drug use: No            Allergies        Allergies   Allergen Reactions    Erythromycin      Sulfa Antibiotics Nausea And Vomiting               Review of Systems

## 2025-01-22 ENCOUNTER — HOSPITAL ENCOUNTER (OUTPATIENT)
Dept: PREADMISSION TESTING | Age: 60
Discharge: HOME | End: 2025-01-22
Payer: COMMERCIAL

## 2025-01-22 VITALS
BODY MASS INDEX: 27.36 KG/M2 | SYSTOLIC BLOOD PRESSURE: 131 MMHG | WEIGHT: 184.7 LBS | RESPIRATION RATE: 16 BRPM | TEMPERATURE: 98.3 F | DIASTOLIC BLOOD PRESSURE: 78 MMHG | HEIGHT: 69 IN | OXYGEN SATURATION: 98 % | HEART RATE: 76 BPM

## 2025-01-22 DIAGNOSIS — Z01.812 PRE-OPERATIVE LABORATORY EXAMINATION: Primary | ICD-10-CM

## 2025-01-22 DIAGNOSIS — T84.84XA PAINFUL ORTHOPAEDIC HARDWARE (HCC): ICD-10-CM

## 2025-01-22 DIAGNOSIS — Z96.698 PRESENCE OF OTHER ORTHOPEDIC JOINT IMPLANTS: ICD-10-CM

## 2025-01-22 LAB
ABO + RH BLD: NORMAL
ALBUMIN SERPL-MCNC: 4.2 G/DL (ref 3.5–5.2)
ALP SERPL-CCNC: 147 U/L (ref 35–104)
ALT SERPL-CCNC: 11 U/L (ref 0–32)
ANION GAP SERPL CALCULATED.3IONS-SCNC: 10 MMOL/L (ref 7–16)
ARM BAND NUMBER: NORMAL
AST SERPL-CCNC: 18 U/L (ref 0–31)
BASOPHILS # BLD: 0.04 K/UL (ref 0–0.2)
BASOPHILS NFR BLD: 1 % (ref 0–2)
BILIRUB SERPL-MCNC: 0.7 MG/DL (ref 0–1.2)
BLOOD BANK SAMPLE EXPIRATION: NORMAL
BLOOD GROUP ANTIBODIES SERPL: NEGATIVE
BUN SERPL-MCNC: 19 MG/DL (ref 6–20)
CALCIUM SERPL-MCNC: 9.5 MG/DL (ref 8.6–10.2)
CHLORIDE SERPL-SCNC: 105 MMOL/L (ref 98–107)
CO2 SERPL-SCNC: 27 MMOL/L (ref 22–29)
CREAT SERPL-MCNC: 0.6 MG/DL (ref 0.5–1)
EOSINOPHIL # BLD: 0.06 K/UL (ref 0.05–0.5)
EOSINOPHILS RELATIVE PERCENT: 2 % (ref 0–6)
ERYTHROCYTE [DISTWIDTH] IN BLOOD BY AUTOMATED COUNT: 13.2 % (ref 11.5–15)
GFR, ESTIMATED: >90 ML/MIN/1.73M2
GLUCOSE SERPL-MCNC: 92 MG/DL (ref 74–99)
HBA1C MFR BLD: 4.8 % (ref 4–5.6)
HCT VFR BLD AUTO: 34 % (ref 34–48)
HGB BLD-MCNC: 11 G/DL (ref 11.5–15.5)
IMM GRANULOCYTES # BLD AUTO: <0.03 K/UL (ref 0–0.58)
IMM GRANULOCYTES NFR BLD: 0 % (ref 0–5)
LYMPHOCYTES NFR BLD: 1.1 K/UL (ref 1.5–4)
LYMPHOCYTES RELATIVE PERCENT: 27 % (ref 20–42)
MCH RBC QN AUTO: 29.7 PG (ref 26–35)
MCHC RBC AUTO-ENTMCNC: 32.4 G/DL (ref 32–34.5)
MCV RBC AUTO: 91.9 FL (ref 80–99.9)
MONOCYTES NFR BLD: 0.35 K/UL (ref 0.1–0.95)
MONOCYTES NFR BLD: 9 % (ref 2–12)
NEUTROPHILS NFR BLD: 62 % (ref 43–80)
NEUTS SEG NFR BLD: 2.56 K/UL (ref 1.8–7.3)
PLATELET # BLD AUTO: 295 K/UL (ref 130–450)
PMV BLD AUTO: 8.8 FL (ref 7–12)
POTASSIUM SERPL-SCNC: 3.9 MMOL/L (ref 3.5–5)
PROT SERPL-MCNC: 7.3 G/DL (ref 6.4–8.3)
RBC # BLD AUTO: 3.7 M/UL (ref 3.5–5.5)
SODIUM SERPL-SCNC: 142 MMOL/L (ref 132–146)
WBC OTHER # BLD: 4.1 K/UL (ref 4.5–11.5)

## 2025-01-22 PROCEDURE — 85025 COMPLETE CBC W/AUTO DIFF WBC: CPT

## 2025-01-22 PROCEDURE — 86850 RBC ANTIBODY SCREEN: CPT

## 2025-01-22 PROCEDURE — 87081 CULTURE SCREEN ONLY: CPT

## 2025-01-22 PROCEDURE — 86901 BLOOD TYPING SEROLOGIC RH(D): CPT

## 2025-01-22 PROCEDURE — 83036 HEMOGLOBIN GLYCOSYLATED A1C: CPT

## 2025-01-22 PROCEDURE — 36415 COLL VENOUS BLD VENIPUNCTURE: CPT

## 2025-01-22 PROCEDURE — 85610 PROTHROMBIN TIME: CPT

## 2025-01-22 PROCEDURE — 80053 COMPREHEN METABOLIC PANEL: CPT

## 2025-01-22 PROCEDURE — 86900 BLOOD TYPING SEROLOGIC ABO: CPT

## 2025-01-22 ASSESSMENT — PAIN DESCRIPTION - ORIENTATION: ORIENTATION: RIGHT

## 2025-01-22 ASSESSMENT — PAIN DESCRIPTION - PAIN TYPE: TYPE: CHRONIC PAIN

## 2025-01-22 ASSESSMENT — PAIN DESCRIPTION - FREQUENCY: FREQUENCY: INTERMITTENT

## 2025-01-22 ASSESSMENT — PAIN DESCRIPTION - DESCRIPTORS: DESCRIPTORS: ACHING

## 2025-01-22 ASSESSMENT — PAIN DESCRIPTION - LOCATION: LOCATION: GROIN

## 2025-01-22 ASSESSMENT — PAIN SCALES - GENERAL: PAINLEVEL_OUTOF10: 3

## 2025-01-23 LAB
INR PPP: 1
MICROORGANISM SPEC CULT: NORMAL
PROTHROMBIN TIME: 10.4 SEC (ref 9.3–12.4)
SPECIMEN DESCRIPTION: NORMAL

## 2025-01-24 NOTE — PROGRESS NOTES
Called and spoke to the patient's  Titi to notify him of time change for his wife's surgery on 1/27.  Scheduled at 9 am.  Arrival time 7 am.  Titi verbalized understanding.

## 2025-01-27 ENCOUNTER — APPOINTMENT (OUTPATIENT)
Dept: GENERAL RADIOLOGY | Age: 60
DRG: 466 | End: 2025-01-27
Attending: ORTHOPAEDIC SURGERY
Payer: COMMERCIAL

## 2025-01-27 ENCOUNTER — ANESTHESIA EVENT (OUTPATIENT)
Dept: OPERATING ROOM | Age: 60
DRG: 466 | End: 2025-01-27
Payer: COMMERCIAL

## 2025-01-27 ENCOUNTER — HOSPITAL ENCOUNTER (INPATIENT)
Age: 60
LOS: 2 days | Discharge: HOME | DRG: 466 | End: 2025-01-29
Attending: ORTHOPAEDIC SURGERY | Admitting: ORTHOPAEDIC SURGERY
Payer: COMMERCIAL

## 2025-01-27 ENCOUNTER — ANESTHESIA (OUTPATIENT)
Dept: OPERATING ROOM | Age: 60
DRG: 466 | End: 2025-01-27
Payer: COMMERCIAL

## 2025-01-27 DIAGNOSIS — F41.1 GENERALIZED ANXIETY DISORDER: ICD-10-CM

## 2025-01-27 DIAGNOSIS — T85.698A: ICD-10-CM

## 2025-01-27 DIAGNOSIS — T84.84XA PAINFUL ORTHOPAEDIC HARDWARE (HCC): ICD-10-CM

## 2025-01-27 DIAGNOSIS — S72.141K CLOSED INTERTROCHANTERIC FRACTURE OF HIP, RIGHT, WITH NONUNION, SUBSEQUENT ENCOUNTER: Primary | ICD-10-CM

## 2025-01-27 DIAGNOSIS — S72.141A CLOSED INTERTROCHANTERIC FRACTURE OF RIGHT FEMUR (HCC): ICD-10-CM

## 2025-01-27 DIAGNOSIS — Z96.698 PRESENCE OF OTHER ORTHOPEDIC JOINT IMPLANTS: ICD-10-CM

## 2025-01-27 PROBLEM — Z96.641 S/P TOTAL RIGHT HIP ARTHROPLASTY: Status: ACTIVE | Noted: 2025-01-27

## 2025-01-27 PROBLEM — Z96.649 S/P REVISION OF TOTAL HIP: Status: ACTIVE | Noted: 2025-01-27

## 2025-01-27 PROCEDURE — 2500000003 HC RX 250 WO HCPCS

## 2025-01-27 PROCEDURE — 6370000000 HC RX 637 (ALT 250 FOR IP)

## 2025-01-27 PROCEDURE — 73502 X-RAY EXAM HIP UNI 2-3 VIEWS: CPT

## 2025-01-27 PROCEDURE — 73552 X-RAY EXAM OF FEMUR 2/>: CPT

## 2025-01-27 PROCEDURE — 2500000003 HC RX 250 WO HCPCS: Performed by: PHYSICIAN ASSISTANT

## 2025-01-27 PROCEDURE — 0SP909Z REMOVAL OF LINER FROM RIGHT HIP JOINT, OPEN APPROACH: ICD-10-PCS | Performed by: ORTHOPAEDIC SURGERY

## 2025-01-27 PROCEDURE — 2580000003 HC RX 258: Performed by: PHYSICIAN ASSISTANT

## 2025-01-27 PROCEDURE — 6360000002 HC RX W HCPCS: Performed by: ORTHOPAEDIC SURGERY

## 2025-01-27 PROCEDURE — 27132 TOTAL HIP ARTHROPLASTY: CPT | Performed by: ORTHOPAEDIC SURGERY

## 2025-01-27 PROCEDURE — 2709999900 HC NON-CHARGEABLE SUPPLY: Performed by: ORTHOPAEDIC SURGERY

## 2025-01-27 PROCEDURE — 6360000002 HC RX W HCPCS

## 2025-01-27 PROCEDURE — 3600000015 HC SURGERY LEVEL 5 ADDTL 15MIN: Performed by: ORTHOPAEDIC SURGERY

## 2025-01-27 PROCEDURE — 88305 TISSUE EXAM BY PATHOLOGIST: CPT

## 2025-01-27 PROCEDURE — 64447 NJX AA&/STRD FEMORAL NRV IMG: CPT | Performed by: STUDENT IN AN ORGANIZED HEALTH CARE EDUCATION/TRAINING PROGRAM

## 2025-01-27 PROCEDURE — C1776 JOINT DEVICE (IMPLANTABLE): HCPCS | Performed by: ORTHOPAEDIC SURGERY

## 2025-01-27 PROCEDURE — 3600000005 HC SURGERY LEVEL 5 BASE: Performed by: ORTHOPAEDIC SURGERY

## 2025-01-27 PROCEDURE — 7100000000 HC PACU RECOVERY - FIRST 15 MIN: Performed by: ORTHOPAEDIC SURGERY

## 2025-01-27 PROCEDURE — 1200000000 HC SEMI PRIVATE

## 2025-01-27 PROCEDURE — 2580000003 HC RX 258

## 2025-01-27 PROCEDURE — 64450 NJX AA&/STRD OTHER PN/BRANCH: CPT | Performed by: STUDENT IN AN ORGANIZED HEALTH CARE EDUCATION/TRAINING PROGRAM

## 2025-01-27 PROCEDURE — C1713 ANCHOR/SCREW BN/BN,TIS/BN: HCPCS | Performed by: ORTHOPAEDIC SURGERY

## 2025-01-27 PROCEDURE — 0SR90JZ REPLACEMENT OF RIGHT HIP JOINT WITH SYNTHETIC SUBSTITUTE, OPEN APPROACH: ICD-10-PCS | Performed by: ORTHOPAEDIC SURGERY

## 2025-01-27 PROCEDURE — 88311 DECALCIFY TISSUE: CPT

## 2025-01-27 PROCEDURE — 2720000010 HC SURG SUPPLY STERILE: Performed by: ORTHOPAEDIC SURGERY

## 2025-01-27 PROCEDURE — 76942 ECHO GUIDE FOR BIOPSY: CPT | Performed by: STUDENT IN AN ORGANIZED HEALTH CARE EDUCATION/TRAINING PROGRAM

## 2025-01-27 PROCEDURE — 6360000002 HC RX W HCPCS: Performed by: PHYSICIAN ASSISTANT

## 2025-01-27 PROCEDURE — 6360000002 HC RX W HCPCS: Performed by: STUDENT IN AN ORGANIZED HEALTH CARE EDUCATION/TRAINING PROGRAM

## 2025-01-27 PROCEDURE — 2500000003 HC RX 250 WO HCPCS: Performed by: ORTHOPAEDIC SURGERY

## 2025-01-27 PROCEDURE — 2500000003 HC RX 250 WO HCPCS: Performed by: NURSE ANESTHETIST, CERTIFIED REGISTERED

## 2025-01-27 PROCEDURE — 3700000000 HC ANESTHESIA ATTENDED CARE: Performed by: ORTHOPAEDIC SURGERY

## 2025-01-27 PROCEDURE — 6360000002 HC RX W HCPCS: Performed by: NURSE ANESTHETIST, CERTIFIED REGISTERED

## 2025-01-27 PROCEDURE — 3700000001 HC ADD 15 MINUTES (ANESTHESIA): Performed by: ORTHOPAEDIC SURGERY

## 2025-01-27 PROCEDURE — 7100000001 HC PACU RECOVERY - ADDTL 15 MIN: Performed by: ORTHOPAEDIC SURGERY

## 2025-01-27 PROCEDURE — 88331 PATH CONSLTJ SURG 1 BLK 1SPC: CPT

## 2025-01-27 DEVICE — 6.5MM LOW PROFILE HEX SCREW 20MM
Type: IMPLANTABLE DEVICE | Site: HIP | Status: FUNCTIONAL
Brand: TRIDENT II

## 2025-01-27 DEVICE — MODULAR HIP SYSTEM
Type: IMPLANTABLE DEVICE | Site: HIP | Status: FUNCTIONAL
Brand: RESTORATION

## 2025-01-27 DEVICE — TRIDENT II CLUSTERHOLE HA ACETABULAR SHELL 52E
Type: IMPLANTABLE DEVICE | Site: HIP | Status: FUNCTIONAL
Brand: TRIDENT II

## 2025-01-27 DEVICE — CERAMIC V40 FEMORAL HEAD
Type: IMPLANTABLE DEVICE | Site: HIP | Status: FUNCTIONAL
Brand: BIOLOX

## 2025-01-27 DEVICE — TRIDENT X3 0 DEGREE POLYETHYLENE INSERT
Type: IMPLANTABLE DEVICE | Site: HIP | Status: FUNCTIONAL
Brand: TRIDENT X3 INSERT

## 2025-01-27 RX ORDER — ONDANSETRON 2 MG/ML
4 INJECTION INTRAMUSCULAR; INTRAVENOUS
Status: DISCONTINUED | OUTPATIENT
Start: 2025-01-27 | End: 2025-01-27 | Stop reason: HOSPADM

## 2025-01-27 RX ORDER — ASPIRIN 325 MG
325 TABLET, DELAYED RELEASE (ENTERIC COATED) ORAL 2 TIMES DAILY
Status: DISCONTINUED | OUTPATIENT
Start: 2025-01-27 | End: 2025-01-27 | Stop reason: SDUPTHER

## 2025-01-27 RX ORDER — ASPIRIN 325 MG
325 TABLET, DELAYED RELEASE (ENTERIC COATED) ORAL 2 TIMES DAILY
Status: DISCONTINUED | OUTPATIENT
Start: 2025-01-27 | End: 2025-01-29 | Stop reason: HOSPADM

## 2025-01-27 RX ORDER — SODIUM CHLORIDE 0.9 % (FLUSH) 0.9 %
5-40 SYRINGE (ML) INJECTION EVERY 12 HOURS SCHEDULED
Status: DISCONTINUED | OUTPATIENT
Start: 2025-01-27 | End: 2025-01-27 | Stop reason: HOSPADM

## 2025-01-27 RX ORDER — EPHEDRINE SULFATE/0.9% NACL/PF 25 MG/5 ML
SYRINGE (ML) INTRAVENOUS
Status: DISCONTINUED | OUTPATIENT
Start: 2025-01-27 | End: 2025-01-27 | Stop reason: SDUPTHER

## 2025-01-27 RX ORDER — ROPIVACAINE HYDROCHLORIDE 5 MG/ML
30 INJECTION, SOLUTION EPIDURAL; INFILTRATION; PERINEURAL
Status: DISCONTINUED | OUTPATIENT
Start: 2025-01-27 | End: 2025-01-27 | Stop reason: HOSPADM

## 2025-01-27 RX ORDER — MIDAZOLAM HYDROCHLORIDE 2 MG/2ML
1 INJECTION, SOLUTION INTRAMUSCULAR; INTRAVENOUS EVERY 5 MIN PRN
Status: DISCONTINUED | OUTPATIENT
Start: 2025-01-27 | End: 2025-01-27 | Stop reason: HOSPADM

## 2025-01-27 RX ORDER — SODIUM CHLORIDE 9 MG/ML
INJECTION, SOLUTION INTRAVENOUS
Status: DISCONTINUED | OUTPATIENT
Start: 2025-01-27 | End: 2025-01-27 | Stop reason: SDUPTHER

## 2025-01-27 RX ORDER — TRANEXAMIC ACID 10 MG/ML
1000 INJECTION, SOLUTION INTRAVENOUS ONCE
Status: COMPLETED | OUTPATIENT
Start: 2025-01-27 | End: 2025-01-27

## 2025-01-27 RX ORDER — TRANEXAMIC ACID 10 MG/ML
1000 INJECTION, SOLUTION INTRAVENOUS ONCE
Status: DISCONTINUED | OUTPATIENT
Start: 2025-01-27 | End: 2025-01-27

## 2025-01-27 RX ORDER — MORPHINE SULFATE 4 MG/ML
4 INJECTION, SOLUTION INTRAMUSCULAR; INTRAVENOUS
Status: DISCONTINUED | OUTPATIENT
Start: 2025-01-27 | End: 2025-01-29 | Stop reason: HOSPADM

## 2025-01-27 RX ORDER — LABETALOL HYDROCHLORIDE 5 MG/ML
5 INJECTION, SOLUTION INTRAVENOUS
Status: DISCONTINUED | OUTPATIENT
Start: 2025-01-27 | End: 2025-01-27 | Stop reason: HOSPADM

## 2025-01-27 RX ORDER — MORPHINE SULFATE 2 MG/ML
2 INJECTION, SOLUTION INTRAMUSCULAR; INTRAVENOUS
Status: DISCONTINUED | OUTPATIENT
Start: 2025-01-27 | End: 2025-01-29 | Stop reason: HOSPADM

## 2025-01-27 RX ORDER — HYDROMORPHONE HYDROCHLORIDE 1 MG/ML
0.25 INJECTION, SOLUTION INTRAMUSCULAR; INTRAVENOUS; SUBCUTANEOUS EVERY 5 MIN PRN
Status: DISCONTINUED | OUTPATIENT
Start: 2025-01-27 | End: 2025-01-27 | Stop reason: HOSPADM

## 2025-01-27 RX ORDER — OXYCODONE HYDROCHLORIDE 5 MG/1
5 TABLET ORAL EVERY 4 HOURS PRN
Status: DISCONTINUED | OUTPATIENT
Start: 2025-01-27 | End: 2025-01-29 | Stop reason: HOSPADM

## 2025-01-27 RX ORDER — SODIUM CHLORIDE 9 MG/ML
INJECTION, SOLUTION INTRAVENOUS PRN
Status: DISCONTINUED | OUTPATIENT
Start: 2025-01-27 | End: 2025-01-27 | Stop reason: HOSPADM

## 2025-01-27 RX ORDER — ONDANSETRON 2 MG/ML
4 INJECTION INTRAMUSCULAR; INTRAVENOUS EVERY 6 HOURS PRN
Status: DISCONTINUED | OUTPATIENT
Start: 2025-01-27 | End: 2025-01-29 | Stop reason: HOSPADM

## 2025-01-27 RX ORDER — MORPHINE SULFATE 2 MG/ML
2 INJECTION, SOLUTION INTRAMUSCULAR; INTRAVENOUS
Status: DISCONTINUED | OUTPATIENT
Start: 2025-01-27 | End: 2025-01-27 | Stop reason: SDUPTHER

## 2025-01-27 RX ORDER — PHENYLEPHRINE HCL IN 0.9% NACL 1 MG/10 ML
SYRINGE (ML) INTRAVENOUS
Status: DISCONTINUED | OUTPATIENT
Start: 2025-01-27 | End: 2025-01-27 | Stop reason: SDUPTHER

## 2025-01-27 RX ORDER — MORPHINE SULFATE 4 MG/ML
4 INJECTION, SOLUTION INTRAMUSCULAR; INTRAVENOUS
Status: DISCONTINUED | OUTPATIENT
Start: 2025-01-27 | End: 2025-01-27 | Stop reason: SDUPTHER

## 2025-01-27 RX ORDER — SODIUM CHLORIDE 0.9 % (FLUSH) 0.9 %
5-40 SYRINGE (ML) INJECTION EVERY 12 HOURS SCHEDULED
Status: DISCONTINUED | OUTPATIENT
Start: 2025-01-27 | End: 2025-01-29 | Stop reason: HOSPADM

## 2025-01-27 RX ORDER — NALOXONE HYDROCHLORIDE 0.4 MG/ML
INJECTION, SOLUTION INTRAMUSCULAR; INTRAVENOUS; SUBCUTANEOUS PRN
Status: DISCONTINUED | OUTPATIENT
Start: 2025-01-27 | End: 2025-01-27 | Stop reason: HOSPADM

## 2025-01-27 RX ORDER — SODIUM CHLORIDE 0.9 % (FLUSH) 0.9 %
5-40 SYRINGE (ML) INJECTION PRN
Status: DISCONTINUED | OUTPATIENT
Start: 2025-01-27 | End: 2025-01-29 | Stop reason: HOSPADM

## 2025-01-27 RX ORDER — MELOXICAM 7.5 MG/1
7.5 TABLET ORAL DAILY
Status: COMPLETED | OUTPATIENT
Start: 2025-01-27 | End: 2025-01-29

## 2025-01-27 RX ORDER — ONDANSETRON 4 MG/1
4 TABLET, ORALLY DISINTEGRATING ORAL EVERY 8 HOURS PRN
Status: DISCONTINUED | OUTPATIENT
Start: 2025-01-27 | End: 2025-01-29 | Stop reason: HOSPADM

## 2025-01-27 RX ORDER — OXYCODONE HYDROCHLORIDE 10 MG/1
10 TABLET ORAL EVERY 4 HOURS PRN
Status: DISCONTINUED | OUTPATIENT
Start: 2025-01-27 | End: 2025-01-27 | Stop reason: SDUPTHER

## 2025-01-27 RX ORDER — MEPERIDINE HYDROCHLORIDE 25 MG/ML
12.5 INJECTION INTRAMUSCULAR; INTRAVENOUS; SUBCUTANEOUS
Status: DISCONTINUED | OUTPATIENT
Start: 2025-01-27 | End: 2025-01-27 | Stop reason: HOSPADM

## 2025-01-27 RX ORDER — PROCHLORPERAZINE EDISYLATE 5 MG/ML
5 INJECTION INTRAMUSCULAR; INTRAVENOUS
Status: DISCONTINUED | OUTPATIENT
Start: 2025-01-27 | End: 2025-01-27 | Stop reason: HOSPADM

## 2025-01-27 RX ORDER — OXYCODONE HYDROCHLORIDE 5 MG/1
5 TABLET ORAL EVERY 4 HOURS PRN
Status: DISCONTINUED | OUTPATIENT
Start: 2025-01-27 | End: 2025-01-27 | Stop reason: SDUPTHER

## 2025-01-27 RX ORDER — SODIUM CHLORIDE 0.9 % (FLUSH) 0.9 %
5-40 SYRINGE (ML) INJECTION PRN
Status: DISCONTINUED | OUTPATIENT
Start: 2025-01-27 | End: 2025-01-27 | Stop reason: HOSPADM

## 2025-01-27 RX ORDER — SODIUM CHLORIDE 9 MG/ML
INJECTION, SOLUTION INTRAVENOUS CONTINUOUS
Status: ACTIVE | OUTPATIENT
Start: 2025-01-27 | End: 2025-01-28

## 2025-01-27 RX ORDER — PROPOFOL 10 MG/ML
INJECTION, EMULSION INTRAVENOUS
Status: DISCONTINUED | OUTPATIENT
Start: 2025-01-27 | End: 2025-01-27 | Stop reason: SDUPTHER

## 2025-01-27 RX ORDER — VASOPRESSIN 20 [USP'U]/ML
INJECTION, SOLUTION INTRAVENOUS
Status: DISCONTINUED | OUTPATIENT
Start: 2025-01-27 | End: 2025-01-27 | Stop reason: SDUPTHER

## 2025-01-27 RX ORDER — DIPHENHYDRAMINE HYDROCHLORIDE 50 MG/ML
12.5 INJECTION INTRAMUSCULAR; INTRAVENOUS
Status: DISCONTINUED | OUTPATIENT
Start: 2025-01-27 | End: 2025-01-27 | Stop reason: HOSPADM

## 2025-01-27 RX ORDER — OXYCODONE AND ACETAMINOPHEN 5; 325 MG/1; MG/1
1 TABLET ORAL EVERY 6 HOURS PRN
Qty: 28 TABLET | Refills: 0 | Status: SHIPPED | OUTPATIENT
Start: 2025-01-27 | End: 2025-02-03

## 2025-01-27 RX ORDER — OXYCODONE HYDROCHLORIDE 10 MG/1
10 TABLET ORAL EVERY 4 HOURS PRN
Status: DISCONTINUED | OUTPATIENT
Start: 2025-01-27 | End: 2025-01-29 | Stop reason: HOSPADM

## 2025-01-27 RX ORDER — BISACODYL 5 MG/1
5 TABLET, DELAYED RELEASE ORAL DAILY PRN
Status: DISCONTINUED | OUTPATIENT
Start: 2025-01-27 | End: 2025-01-29 | Stop reason: HOSPADM

## 2025-01-27 RX ORDER — DEXAMETHASONE SODIUM PHOSPHATE 10 MG/ML
INJECTION INTRAMUSCULAR; INTRAVENOUS
Status: DISCONTINUED | OUTPATIENT
Start: 2025-01-27 | End: 2025-01-27 | Stop reason: SDUPTHER

## 2025-01-27 RX ORDER — VANCOMYCIN HYDROCHLORIDE 1 G/20ML
INJECTION, POWDER, LYOPHILIZED, FOR SOLUTION INTRAVENOUS PRN
Status: DISCONTINUED | OUTPATIENT
Start: 2025-01-27 | End: 2025-01-27 | Stop reason: HOSPADM

## 2025-01-27 RX ORDER — HYDROMORPHONE HYDROCHLORIDE 1 MG/ML
0.5 INJECTION, SOLUTION INTRAMUSCULAR; INTRAVENOUS; SUBCUTANEOUS EVERY 5 MIN PRN
Status: DISCONTINUED | OUTPATIENT
Start: 2025-01-27 | End: 2025-01-27 | Stop reason: HOSPADM

## 2025-01-27 RX ORDER — IPRATROPIUM BROMIDE AND ALBUTEROL SULFATE 2.5; .5 MG/3ML; MG/3ML
1 SOLUTION RESPIRATORY (INHALATION)
Status: DISCONTINUED | OUTPATIENT
Start: 2025-01-27 | End: 2025-01-27 | Stop reason: HOSPADM

## 2025-01-27 RX ORDER — LIDOCAINE HYDROCHLORIDE 20 MG/ML
INJECTION, SOLUTION INTRAVENOUS
Status: DISCONTINUED | OUTPATIENT
Start: 2025-01-27 | End: 2025-01-27 | Stop reason: SDUPTHER

## 2025-01-27 RX ORDER — ONDANSETRON 2 MG/ML
INJECTION INTRAMUSCULAR; INTRAVENOUS
Status: DISCONTINUED | OUTPATIENT
Start: 2025-01-27 | End: 2025-01-27 | Stop reason: SDUPTHER

## 2025-01-27 RX ORDER — ASPIRIN 325 MG
325 TABLET, DELAYED RELEASE (ENTERIC COATED) ORAL 2 TIMES DAILY
Qty: 56 TABLET | Refills: 0 | Status: SHIPPED | OUTPATIENT
Start: 2025-01-27 | End: 2025-02-24

## 2025-01-27 RX ORDER — SODIUM CHLORIDE 9 MG/ML
INJECTION, SOLUTION INTRAVENOUS PRN
Status: DISCONTINUED | OUTPATIENT
Start: 2025-01-27 | End: 2025-01-29 | Stop reason: HOSPADM

## 2025-01-27 RX ORDER — TOBRAMYCIN 1.2 G/30ML
INJECTION, POWDER, LYOPHILIZED, FOR SOLUTION INTRAVENOUS PRN
Status: DISCONTINUED | OUTPATIENT
Start: 2025-01-27 | End: 2025-01-27 | Stop reason: HOSPADM

## 2025-01-27 RX ORDER — ACETAMINOPHEN 325 MG/1
650 TABLET ORAL EVERY 6 HOURS
Status: DISCONTINUED | OUTPATIENT
Start: 2025-01-27 | End: 2025-01-29 | Stop reason: HOSPADM

## 2025-01-27 RX ADMIN — VASOPRESSIN 1 UNITS: 20 INJECTION INTRAVENOUS at 11:10

## 2025-01-27 RX ADMIN — EPHEDRINE SULFATE 5 MG: 5 INJECTION INTRAVENOUS at 10:43

## 2025-01-27 RX ADMIN — VASOPRESSIN 1 UNITS: 20 INJECTION INTRAVENOUS at 12:10

## 2025-01-27 RX ADMIN — SODIUM CHLORIDE: 9 INJECTION, SOLUTION INTRAVENOUS at 11:10

## 2025-01-27 RX ADMIN — VASOPRESSIN 1 UNITS: 20 INJECTION INTRAVENOUS at 10:55

## 2025-01-27 RX ADMIN — VASOPRESSIN 2 UNITS: 20 INJECTION INTRAVENOUS at 11:34

## 2025-01-27 RX ADMIN — CEFAZOLIN 2000 MG: 2 INJECTION, POWDER, FOR SOLUTION INTRAMUSCULAR; INTRAVENOUS at 10:10

## 2025-01-27 RX ADMIN — Medication 100 MCG: at 10:34

## 2025-01-27 RX ADMIN — TRANEXAMIC ACID 1000 MG: 10 INJECTION, SOLUTION INTRAVENOUS at 10:13

## 2025-01-27 RX ADMIN — ACETAMINOPHEN 650 MG: 325 TABLET ORAL at 21:11

## 2025-01-27 RX ADMIN — EPHEDRINE SULFATE 5 MG: 5 INJECTION INTRAVENOUS at 11:31

## 2025-01-27 RX ADMIN — ASPIRIN 325 MG: 325 TABLET, COATED ORAL at 21:11

## 2025-01-27 RX ADMIN — OXYCODONE 5 MG: 5 TABLET ORAL at 19:37

## 2025-01-27 RX ADMIN — DEXAMETHASONE SODIUM PHOSPHATE 10 MG: 10 INJECTION INTRAMUSCULAR; INTRAVENOUS at 10:21

## 2025-01-27 RX ADMIN — PROPOFOL 80 MCG/KG/MIN: 10 INJECTION, EMULSION INTRAVENOUS at 10:10

## 2025-01-27 RX ADMIN — SODIUM CHLORIDE: 9 INJECTION, SOLUTION INTRAVENOUS at 08:01

## 2025-01-27 RX ADMIN — Medication 100 MCG: at 10:41

## 2025-01-27 RX ADMIN — SODIUM CHLORIDE, PRESERVATIVE FREE 10 ML: 5 INJECTION INTRAVENOUS at 21:13

## 2025-01-27 RX ADMIN — EPHEDRINE SULFATE 5 MG: 5 INJECTION INTRAVENOUS at 11:09

## 2025-01-27 RX ADMIN — PROPOFOL 70 MG: 10 INJECTION, EMULSION INTRAVENOUS at 10:09

## 2025-01-27 RX ADMIN — Medication 200 MCG: at 10:24

## 2025-01-27 RX ADMIN — OXYCODONE HYDROCHLORIDE 10 MG: 10 TABLET ORAL at 15:26

## 2025-01-27 RX ADMIN — Medication 100 MCG: at 10:17

## 2025-01-27 RX ADMIN — VASOPRESSIN 1 UNITS: 20 INJECTION INTRAVENOUS at 11:57

## 2025-01-27 RX ADMIN — MELOXICAM 7.5 MG: 7.5 TABLET ORAL at 21:11

## 2025-01-27 RX ADMIN — MIDAZOLAM 2 MG: 1 INJECTION INTRAMUSCULAR; INTRAVENOUS at 08:24

## 2025-01-27 RX ADMIN — SODIUM CHLORIDE: 9 INJECTION, SOLUTION INTRAVENOUS at 09:55

## 2025-01-27 RX ADMIN — WATER 2000 MG: 1 INJECTION INTRAMUSCULAR; INTRAVENOUS; SUBCUTANEOUS at 21:11

## 2025-01-27 RX ADMIN — ONDANSETRON 4 MG: 2 INJECTION, SOLUTION INTRAMUSCULAR; INTRAVENOUS at 10:21

## 2025-01-27 RX ADMIN — LIDOCAINE HYDROCHLORIDE 100 MG: 20 INJECTION, SOLUTION INTRAVENOUS at 10:09

## 2025-01-27 RX ADMIN — VASOPRESSIN 1 UNITS: 20 INJECTION INTRAVENOUS at 11:24

## 2025-01-27 RX ADMIN — EPHEDRINE SULFATE 10 MG: 5 INJECTION INTRAVENOUS at 10:53

## 2025-01-27 ASSESSMENT — PAIN - FUNCTIONAL ASSESSMENT
PAIN_FUNCTIONAL_ASSESSMENT: 0-10
PAIN_FUNCTIONAL_ASSESSMENT: NONE - DENIES PAIN

## 2025-01-27 ASSESSMENT — PAIN DESCRIPTION - PAIN TYPE: TYPE: SURGICAL PAIN

## 2025-01-27 ASSESSMENT — PAIN DESCRIPTION - LOCATION
LOCATION: HIP
LOCATION: HIP

## 2025-01-27 ASSESSMENT — PAIN DESCRIPTION - DESCRIPTORS: DESCRIPTORS: BURNING;DISCOMFORT

## 2025-01-27 ASSESSMENT — PAIN SCALES - GENERAL
PAINLEVEL_OUTOF10: 6
PAINLEVEL_OUTOF10: 7
PAINLEVEL_OUTOF10: 3

## 2025-01-27 ASSESSMENT — PAIN DESCRIPTION - ORIENTATION
ORIENTATION: RIGHT
ORIENTATION: RIGHT

## 2025-01-27 NOTE — ANESTHESIA PROCEDURE NOTES
Peripheral Block    Patient location during procedure: procedure area  Reason for block: post-op pain management  Start time: 1/27/2025 8:26 AM  Staffing  Performed: anesthesiologist   Anesthesiologist: Anton Garcia DO  Performed by: Anton Garcia DO  Authorized by: Anton Gacria DO    Preanesthetic Checklist  Completed: patient identified, IV checked, site marked, risks and benefits discussed, surgical/procedural consents, equipment checked, pre-op evaluation, timeout performed, anesthesia consent given, oxygen available, monitors applied/VS acknowledged, fire risk safety assessment completed and verbalized and blood product R/B/A discussed and consented  Peripheral Block   Patient position: supine  Prep: ChloraPrep  Provider prep: sterile gloves and mask  Patient monitoring: continuous pulse ox, frequent blood pressure checks, IV access, oxygen and responsive to questions  Block type: Fascia iliaca (PENG)  Laterality: right  Injection technique: single-shot  Guidance: ultrasound guided  Local infiltration: ropivacaine  Infiltration strength: 0.5 %  Local infiltration: ropivacaine  Dose: 17 mL    Needle   Needle type: insulated echogenic nerve stimulator needle   Needle gauge: 20 G  Needle localization: ultrasound guidance  Needle length: 10 cm  Assessment   Injection assessment: negative aspiration for heme, no paresthesia on injection, local visualized surrounding nerve on ultrasound, no intravascular symptoms and low pressure verified by pressure monitor  Paresthesia pain: none  Slow fractionated injection: yes  Hemodynamics: stable  Outcomes: uncomplicated and patient tolerated procedure well

## 2025-01-27 NOTE — PROGRESS NOTES
INTRAOPERATIVE CONSULTATION (with FROZEN SECTION)    A. Right hip deep tissue: Negative for acute inflammation.     B. Right hip deep tissue #2: Negative for acute inflammation.

## 2025-01-27 NOTE — ANESTHESIA PROCEDURE NOTES
Spinal Block    Patient location during procedure: OR  End time: 1/27/2025 10:05 AM  Reason for block: procedure for pain, primary anesthetic and at surgeon's request  Staffing  Performed: resident/CRNA   Anesthesiologist: Anton Garcia DO  Resident/CRNA: Ailyn Elena APRN - CRNA  Performed by: Whitley Acuña APRN - CRNA  Authorized by: Anton Garcia DO    Spinal Block  Patient position: sitting  Prep: ChloraPrep  Patient monitoring: continuous pulse ox and frequent blood pressure checks  Approach: midline  Location: L4/L5  Provider prep: mask and sterile gloves  Local infiltration: lidocaine  Needle  Needle type: Isabell   Needle gauge: 25 G  Needle length: 3.5 in  Assessment  Swirl obtained: Yes  Attempts: 2  Hemodynamics: stable  Preanesthetic Checklist  Completed: patient identified, IV checked, site marked, risks and benefits discussed, surgical/procedural consents, equipment checked, pre-op evaluation, timeout performed, anesthesia consent given, oxygen available, monitors applied/VS acknowledged, fire risk safety assessment completed and verbalized and blood product R/B/A discussed and consented

## 2025-01-27 NOTE — ANESTHESIA PRE PROCEDURE
Department of Anesthesiology  Preprocedure Note       Name:  Natasha Washburn   Age:  59 y.o.  :  1965                                          MRN:  [id number]         Date:  2025      Surgeon: Surgeon(s):  Len Garcia MD    Procedure: Procedure(s):  REMOVAL OF CURRENT ORTHOPEDIC HARDWARE FROM RIGHT FEMUR, CONVERSION TO RESTORATION MODULAR RIGHT TOTAL HIP ARTHROPLASTY    Medications prior to admission:   Prior to Admission medications    Medication Sig Start Date End Date Taking? Authorizing Provider   citalopram (CELEXA) 10 MG tablet Take 1 tablet by mouth daily  Patient taking differently: Take 53 tablets by mouth daily 25  Yes Landon Cortés, DO   citalopram (CELEXA) 20 MG tablet Take 1 tablet by mouth daily 25  Yes Landon Cortés DO   amLODIPine (NORVASC) 5 MG tablet Take 1 tablet by mouth daily 25  Yes Landon Cortés DO   losartan (COZAAR) 50 MG tablet Take 1 tablet by mouth daily 25  Yes Landon Cortés,    Multiple Vitamins-Minerals (MULTIVITAMIN WOMEN 50+) TABS Take 1 tablet by mouth daily   Yes Mani Johnson MD   Glucosamine-Chondroitin (GLUCOSAMINE CHONDR COMPLEX PO) Take 1,200 mg by mouth daily Takes 2 tablets daily   Yes Mani Johnson MD   Misc. Devices (CPAP MACHINE) MISC by Does not apply route continuous 10    Mani Johnson MD   estrogens conjugated (PREMARIN) 0.625 MG/GM CREA vaginal cream Place 0.5 g vaginally Twice a Week  Patient not taking: Reported on 2025    Mani Johnson MD       Current medications:    Current Facility-Administered Medications   Medication Dose Route Frequency Provider Last Rate Last Admin    sodium chloride flush 0.9 % injection 5-40 mL  5-40 mL IntraVENous 2 times per day Mingo Baig PA-C        sodium chloride flush 0.9 % injection 5-40 mL  5-40 mL IntraVENous PRN Mingo Baig PA-C        0.9 % sodium chloride infusion   IntraVENous PRN

## 2025-01-27 NOTE — ANESTHESIA PROCEDURE NOTES
Peripheral Block    Patient location during procedure: procedure area  Reason for block: post-op pain management  Start time: 1/27/2025 8:28 AM  Staffing  Performed: anesthesiologist   Anesthesiologist: Anton Garcia DO  Performed by: Anton Garcia DO  Authorized by: Anton Garcia DO    Preanesthetic Checklist  Completed: patient identified, IV checked, site marked, risks and benefits discussed, surgical/procedural consents, equipment checked, pre-op evaluation, timeout performed, anesthesia consent given, oxygen available, monitors applied/VS acknowledged, fire risk safety assessment completed and verbalized and blood product R/B/A discussed and consented  Peripheral Block   Patient position: supine  Prep: ChloraPrep  Provider prep: sterile gloves and mask  Patient monitoring: continuous pulse ox, frequent blood pressure checks, IV access, oxygen and responsive to questions  Block type: Femoral lateral cutaneous  Laterality: right  Injection technique: single-shot  Guidance: ultrasound guided  Local infiltration: ropivacaine  Infiltration strength: 0.5 %  Local infiltration: ropivacaine  Dose: 3 mL    Needle   Needle type: insulated echogenic nerve stimulator needle   Needle gauge: 20 G  Needle localization: ultrasound guidance  Needle length: 10 cm  Assessment   Injection assessment: negative aspiration for heme, no paresthesia on injection, local visualized surrounding nerve on ultrasound, no intravascular symptoms and low pressure verified by pressure monitor  Paresthesia pain: none  Slow fractionated injection: yes  Hemodynamics: stable  Outcomes: uncomplicated and patient tolerated procedure well

## 2025-01-27 NOTE — DISCHARGE INSTRUCTIONS
[facility] Department of Orthopedic Surgery  [address] [address]    Dr. Anton Huang, MD Dr. Anton Lopez Dr., MD Frank Ansevin, PA-C Sara Zatchok, PA-C Tyler Tsangaris PA-C      Orthopaedics Discharge Instructions   Weight bearing Status - Weight bearing as tolerated - on right lower Extremity  Pain medication Per Prescriptions  Contact Office for Medication Refill- [phone]  Office can refill pain med every 7 days  If patient discharging to facility then pain control will be continued per facility physician  Ice to operative/injured site for 15-30 minutes of each hour for next 5 days    Recommend that you continue to ice the area 2-3 times per day after this   Elevate operative/injured limb on 2 pillows at home  Goal is to have limb above the heart if able  Continue DVT Prophylaxis (blood clot prevention) as Prescribed: Aspirin 325 mg twice a day   Wound care - Leave dressing until office visit    Follow Up in Office in 2 weeks. Your first post op appointment is often with one of our PAs.     Call the office at [phone] or directions or with any questions.  Watch for these significant complications.  Call physician if they or any other problems occur:  Fever over 101°, redness, swelling or warmth at the operative site  Unrelieved nausea    Foul smelling or cloudy drainage at the operative site   Unrelieved pain    Blood soaked dressing. (Some oozing may be normal)     Numb, pale, blue, cold or tingling extremity    Future Appointments   Date Time Provider Department Center   2/10/2025  8:00 AM SCHEDULE, SE ORTHO APC [facility]   3/28/2025  9:00 AM SCHEDULE, SHASHANKYX Laie AUDIOLOGY [facility]   3/28/2025  9:15 AM Newton Atkins DO [facility]         It is the Department of Orthopaedic Trauma's standard of practice that providers will de-escalate(wean) all patients from narcotic(opioid) medications during the post-operative period.

## 2025-01-27 NOTE — INTERVAL H&P NOTE
Update History & Physical    The patient's History and Physical of January 15, 2025 was reviewed with the patient and I examined the patient. There was no change. The surgical site was confirmed by the patient and me.     Plan: The risks, benefits, expected outcome, and alternative to the recommended procedure have been discussed with the patient. Patient understands and wants to proceed with the procedure.     Electronically signed by Len Garcia MD on 1/27/2025 at 8:38 AM

## 2025-01-27 NOTE — ANESTHESIA POSTPROCEDURE EVALUATION
Department of Anesthesiology  Postprocedure Note    Patient: Natasha Washburn  MRN: [id number]  YOB: 1965  Date of evaluation: 1/27/2025    Procedure Summary       Date: 01/27/25 Room / Location: [address]    Anesthesia Start: 0955 Anesthesia Stop: 1240    Procedure: REMOVAL OF CURRENT ORTHOPEDIC HARDWARE FROM RIGHT FEMUR, CONVERSION TO RESTORATION MODULAR RIGHT TOTAL HIP ARTHROPLASTY (Right) Diagnosis:       Closed intertrochanteric fracture of right femur (HCC)      Mechanical complication due to implant and internal device      Presence of other orthopedic joint implants      Painful orthopaedic hardware (HCC)      (Closed intertrochanteric fracture of right femur (HCC) [S72.141A])      (Mechanical complication due to implant and internal device [T85.698A])      (Presence of other orthopedic joint implants [Z96.698])      (Painful orthopaedic hardware (HCC) [T84.84XA])    Surgeons: Len Garcia MD Responsible Provider: Anton Garcia DO    Anesthesia Type: MAC, Spinal, Regional ASA Status: 3            Anesthesia Type: MAC, Spinal, Regional    Dayanara Phase I: Dayanara Score: 10    Dayanara Phase II:      Anesthesia Post Evaluation    Patient location during evaluation: PACU  Patient participation: complete - patient participated  Level of consciousness: awake and awake and alert  Pain score: 0  Airway patency: patent  Nausea & Vomiting: no nausea and no vomiting  Cardiovascular status: blood pressure returned to baseline and hemodynamically stable  Respiratory status: acceptable, room air and nonlabored ventilation  Hydration status: euvolemic  Pain management: adequate and satisfactory to patient        No notable events documented.

## 2025-01-27 NOTE — ANESTHESIA PROCEDURE NOTES
Peripheral Block    Patient location during procedure: procedure area  Reason for block: post-op pain management  Start time: 1/27/2025 8:24 AM  Staffing  Performed: anesthesiologist   Anesthesiologist: Anton Garcia DO  Performed by: Anton Garcia DO  Authorized by: Anton Garcia DO    Preanesthetic Checklist  Completed: patient identified, IV checked, site marked, risks and benefits discussed, surgical/procedural consents, equipment checked, pre-op evaluation, timeout performed, anesthesia consent given, oxygen available, monitors applied/VS acknowledged, fire risk safety assessment completed and verbalized and blood product R/B/A discussed and consented  Peripheral Block   Patient position: supine  Prep: ChloraPrep  Provider prep: sterile gloves and mask  Patient monitoring: continuous pulse ox, frequent blood pressure checks, IV access, oxygen and responsive to questions  Block type: Femoral  Laterality: right  Injection technique: single-shot  Guidance: ultrasound guided  Local infiltration: ropivacaine  Infiltration strength: 0.5 %  Local infiltration: ropivacaine  Dose: 10 mL    Needle   Needle type: insulated echogenic nerve stimulator needle   Needle gauge: 20 G  Needle localization: ultrasound guidance  Needle length: 10 cm  Assessment   Injection assessment: negative aspiration for heme, no paresthesia on injection, local visualized surrounding nerve on ultrasound, no intravascular symptoms and low pressure verified by pressure monitor  Paresthesia pain: none  Slow fractionated injection: yes  Hemodynamics: stable  Outcomes: uncomplicated and patient tolerated procedure well

## 2025-01-27 NOTE — OP NOTE
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 with screw cut out through femoral head and damaged acetabulum    Post-Op Diagnosis: Same       Procedure:  Conversion of right cephalomedullary nail to restoration modular total hip arthroplasty    Surgeon:  Len Garcia MD    Assistant:   Resident: Washington Rand DO; Hung Greene DO; Cory Miles DO    Anesthesia: General    Estimated Blood Loss (mL): 300     Complications: None    Specimens:   ID Type Source Tests Collected by Time Destination   A : right hip deep tissue Tissue Tissue SURGICAL PATHOLOGY Len Garcia MD 1/27/2025 1044    B : Right Hip Deep Tissue #2 Tissue Tissue SURGICAL PATHOLOGY Len Garcia MD 1/27/2025 1056    C : Femoral Head Bone Bone SURGICAL PATHOLOGY Len Garcia MD 1/27/2025 1204        Implants:  Implant Name Type Inv. Item Serial No.  Lot No. LRB No. Used Action   SCREW BNE L20MM [id number] LO PROF HEX TRIDENT LL - [id number]  SCREW BNE L20MM [id number] LO PROF HEX TRIDENT LL  [employer] [facility] [id number] Right 1 Implanted   INSERT ACET E 0 DEG 36 MM HIP X3 TRIDENT - [id number]  INSERT ACET E 0 DEG 36 MM HIP X3 TRIDENT  [employer] [facility] [id number] Right 1 Implanted   SHELL TRIDENT II CLUSTERHOLE HA ACET 52E - [id number]  SHELL TRIDENT II CLUSTERHOLE HA ACET 52E  [employer] [facility] [id number] Right 1 Implanted   SCREW BNE L20MM [id number] LO PROF HEX TRIDENT LL - [id number]  SCREW BNE L20MM [id number] LO PROF HEX TRIDENT LL  [employer] [facility] [id number] Right 1 Implanted   STEM FEM L155MM [id number] DST HIP TI HA STR CONIC EWELINA MOD REV - [id number]  STEM FEM L155MM [id number] DST HIP TI HA STR CONIC EWELINA MOD REV  [employer] [facility] [id number] Right 1 Implanted   BODY FEM [id number] +20MM OFFSET

## 2025-01-28 LAB
ANION GAP SERPL CALCULATED.3IONS-SCNC: 12 MMOL/L (ref 7–16)
BASOPHILS # BLD: 0.01 K/UL (ref 0–0.2)
BASOPHILS NFR BLD: 0 % (ref 0–2)
BUN SERPL-MCNC: 17 MG/DL (ref 6–20)
CALCIUM SERPL-MCNC: 8.9 MG/DL (ref 8.6–10.2)
CHLORIDE SERPL-SCNC: 104 MMOL/L (ref 98–107)
CO2 SERPL-SCNC: 25 MMOL/L (ref 22–29)
CREAT SERPL-MCNC: 0.7 MG/DL (ref 0.5–1)
EOSINOPHIL # BLD: 0.02 K/UL (ref 0.05–0.5)
EOSINOPHILS RELATIVE PERCENT: 0 % (ref 0–6)
ERYTHROCYTE [DISTWIDTH] IN BLOOD BY AUTOMATED COUNT: 13.4 % (ref 11.5–15)
GFR, ESTIMATED: >90 ML/MIN/1.73M2
GLUCOSE SERPL-MCNC: 109 MG/DL (ref 74–99)
HCT VFR BLD AUTO: 29.7 % (ref 34–48)
HGB BLD-MCNC: 9.3 G/DL (ref 11.5–15.5)
IMM GRANULOCYTES # BLD AUTO: <0.03 K/UL (ref 0–0.58)
IMM GRANULOCYTES NFR BLD: 0 % (ref 0–5)
LYMPHOCYTES NFR BLD: 0.94 K/UL (ref 1.5–4)
LYMPHOCYTES RELATIVE PERCENT: 18 % (ref 20–42)
MCH RBC QN AUTO: 30.4 PG (ref 26–35)
MCHC RBC AUTO-ENTMCNC: 31.3 G/DL (ref 32–34.5)
MCV RBC AUTO: 97.1 FL (ref 80–99.9)
MONOCYTES NFR BLD: 0.65 K/UL (ref 0.1–0.95)
MONOCYTES NFR BLD: 12 % (ref 2–12)
NEUTROPHILS NFR BLD: 70 % (ref 43–80)
NEUTS SEG NFR BLD: 3.75 K/UL (ref 1.8–7.3)
PLATELET # BLD AUTO: 319 K/UL (ref 130–450)
PMV BLD AUTO: 9.1 FL (ref 7–12)
POTASSIUM SERPL-SCNC: 3.9 MMOL/L (ref 3.5–5)
RBC # BLD AUTO: 3.06 M/UL (ref 3.5–5.5)
SODIUM SERPL-SCNC: 141 MMOL/L (ref 132–146)
WBC OTHER # BLD: 5.4 K/UL (ref 4.5–11.5)

## 2025-01-28 PROCEDURE — 1200000000 HC SEMI PRIVATE

## 2025-01-28 PROCEDURE — 97535 SELF CARE MNGMENT TRAINING: CPT

## 2025-01-28 PROCEDURE — 97161 PT EVAL LOW COMPLEX 20 MIN: CPT

## 2025-01-28 PROCEDURE — 97530 THERAPEUTIC ACTIVITIES: CPT

## 2025-01-28 PROCEDURE — 85025 COMPLETE CBC W/AUTO DIFF WBC: CPT

## 2025-01-28 PROCEDURE — 6370000000 HC RX 637 (ALT 250 FOR IP): Performed by: INTERNAL MEDICINE

## 2025-01-28 PROCEDURE — 80048 BASIC METABOLIC PNL TOTAL CA: CPT

## 2025-01-28 PROCEDURE — 2500000003 HC RX 250 WO HCPCS

## 2025-01-28 PROCEDURE — 97165 OT EVAL LOW COMPLEX 30 MIN: CPT

## 2025-01-28 PROCEDURE — 6370000000 HC RX 637 (ALT 250 FOR IP)

## 2025-01-28 PROCEDURE — 6360000002 HC RX W HCPCS

## 2025-01-28 PROCEDURE — 36415 COLL VENOUS BLD VENIPUNCTURE: CPT

## 2025-01-28 RX ORDER — CITALOPRAM HYDROBROMIDE 20 MG/1
20 TABLET ORAL DAILY
Status: DISCONTINUED | OUTPATIENT
Start: 2025-01-28 | End: 2025-01-29 | Stop reason: HOSPADM

## 2025-01-28 RX ORDER — AMLODIPINE BESYLATE 5 MG/1
5 TABLET ORAL DAILY
Status: DISCONTINUED | OUTPATIENT
Start: 2025-01-28 | End: 2025-01-29 | Stop reason: HOSPADM

## 2025-01-28 RX ORDER — LOSARTAN POTASSIUM 50 MG/1
50 TABLET ORAL DAILY
Status: DISCONTINUED | OUTPATIENT
Start: 2025-01-28 | End: 2025-01-29 | Stop reason: HOSPADM

## 2025-01-28 RX ORDER — CITALOPRAM HYDROBROMIDE 20 MG/1
10 TABLET ORAL DAILY
Status: DISCONTINUED | OUTPATIENT
Start: 2025-01-28 | End: 2025-01-29 | Stop reason: HOSPADM

## 2025-01-28 RX ADMIN — SODIUM CHLORIDE, PRESERVATIVE FREE 10 ML: 5 INJECTION INTRAVENOUS at 08:31

## 2025-01-28 RX ADMIN — OXYCODONE HYDROCHLORIDE 10 MG: 10 TABLET ORAL at 04:25

## 2025-01-28 RX ADMIN — SODIUM CHLORIDE, PRESERVATIVE FREE 10 ML: 5 INJECTION INTRAVENOUS at 22:02

## 2025-01-28 RX ADMIN — LOSARTAN POTASSIUM 50 MG: 50 TABLET, FILM COATED ORAL at 08:26

## 2025-01-28 RX ADMIN — OXYCODONE HYDROCHLORIDE 10 MG: 10 TABLET ORAL at 17:25

## 2025-01-28 RX ADMIN — OXYCODONE HYDROCHLORIDE 10 MG: 10 TABLET ORAL at 13:23

## 2025-01-28 RX ADMIN — AMLODIPINE BESYLATE 5 MG: 5 TABLET ORAL at 08:27

## 2025-01-28 RX ADMIN — OXYCODONE HYDROCHLORIDE 10 MG: 10 TABLET ORAL at 08:39

## 2025-01-28 RX ADMIN — OXYCODONE HYDROCHLORIDE 10 MG: 10 TABLET ORAL at 00:25

## 2025-01-28 RX ADMIN — ASPIRIN 325 MG: 325 TABLET, COATED ORAL at 08:27

## 2025-01-28 RX ADMIN — WATER 2000 MG: 1 INJECTION INTRAMUSCULAR; INTRAVENOUS; SUBCUTANEOUS at 04:26

## 2025-01-28 RX ADMIN — ACETAMINOPHEN 650 MG: 325 TABLET ORAL at 08:26

## 2025-01-28 RX ADMIN — ACETAMINOPHEN 650 MG: 325 TABLET ORAL at 17:26

## 2025-01-28 RX ADMIN — OXYCODONE HYDROCHLORIDE 10 MG: 10 TABLET ORAL at 22:02

## 2025-01-28 RX ADMIN — SODIUM CHLORIDE, PRESERVATIVE FREE 10 ML: 5 INJECTION INTRAVENOUS at 04:27

## 2025-01-28 RX ADMIN — ASPIRIN 325 MG: 325 TABLET, COATED ORAL at 22:02

## 2025-01-28 RX ADMIN — POLYVINYL ALCOHOL, POVIDONE 1 DROP: 14; 6 SOLUTION/ DROPS OPHTHALMIC at 06:22

## 2025-01-28 RX ADMIN — MELOXICAM 7.5 MG: 7.5 TABLET ORAL at 08:27

## 2025-01-28 RX ADMIN — CITALOPRAM HYDROBROMIDE 20 MG: 20 TABLET ORAL at 08:26

## 2025-01-28 RX ADMIN — CITALOPRAM HYDROBROMIDE 10 MG: 20 TABLET ORAL at 08:29

## 2025-01-28 ASSESSMENT — PAIN SCALES - GENERAL
PAINLEVEL_OUTOF10: 7
PAINLEVEL_OUTOF10: 7
PAINLEVEL_OUTOF10: 5
PAINLEVEL_OUTOF10: 0
PAINLEVEL_OUTOF10: 7
PAINLEVEL_OUTOF10: 7
PAINLEVEL_OUTOF10: 5
PAINLEVEL_OUTOF10: 4
PAINLEVEL_OUTOF10: 4
PAINLEVEL_OUTOF10: 3
PAINLEVEL_OUTOF10: 7

## 2025-01-28 ASSESSMENT — PAIN DESCRIPTION - FREQUENCY
FREQUENCY: INTERMITTENT
FREQUENCY: CONTINUOUS
FREQUENCY: INTERMITTENT

## 2025-01-28 ASSESSMENT — PAIN DESCRIPTION - ONSET
ONSET: PROGRESSIVE
ONSET: AWAKENED FROM SLEEP
ONSET: GRADUAL

## 2025-01-28 ASSESSMENT — PAIN DESCRIPTION - LOCATION
LOCATION: LEG;HIP
LOCATION: HIP
LOCATION: LEG;HIP
LOCATION: HIP
LOCATION: HIP

## 2025-01-28 ASSESSMENT — PAIN DESCRIPTION - ORIENTATION
ORIENTATION: RIGHT

## 2025-01-28 ASSESSMENT — PAIN DESCRIPTION - PAIN TYPE
TYPE: SURGICAL PAIN
TYPE: ACUTE PAIN;SURGICAL PAIN
TYPE: SURGICAL PAIN

## 2025-01-28 ASSESSMENT — PAIN DESCRIPTION - DESCRIPTORS
DESCRIPTORS: ACHING;DISCOMFORT;THROBBING
DESCRIPTORS: ACHING;DISCOMFORT;THROBBING
DESCRIPTORS: ACHING
DESCRIPTORS: ACHING;DISCOMFORT;THROBBING
DESCRIPTORS: ACHING;THROBBING;DISCOMFORT
DESCRIPTORS: ACHING;DISCOMFORT;DULL

## 2025-01-28 NOTE — PROGRESS NOTES
OCCUPATIONAL THERAPY INITIAL EVALUATION    [facility] [address], [place]      Date:2025                                                Patient Name: Natasha Washburn  MRN: [id number]  : 1965  Room: 5405404     Evaluating OT:Marsha Arteaga OTR/L   License #  [id number]       Referring Provider:     Cory Miles DO       Specific Provider Orders/Date: OT evaluation & treatment        Diagnosis: Closed intertrochanteric fracture of right femur (HCC) [S72.141A]  Mechanical complication due to implant and internal device [T85.698A]  Presence of other orthopedic joint implants [Z96.698]  Painful orthopaedic hardware (HCC) [T84.84XA]  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    Surgery: 25: S/p conversion of right cephalomedullary nail to restoration modular total hip arthroplasty      Past Surgical History:  has a past surgical history that includes back surgery; Breast surgery; Femur Surgery (Right, 2024); Total shoulder arthroplasty (Left); meniscectomy; and Revision total hip arthroplasty (Right, 2025).       Precautions:  Fall Risk, WBAT R LE, anterolateral hip prec.      Assessment of current deficits    [x] Functional mobility            [x]ADLs           [x] Strength                  [x]Cognition    [x] Functional transfers          [x] IADLs         [x] Safety Awareness   [x]Endurance    [x] Fine Coordination                         [x] Balance      [] Vision/perception   [x]Sensation      []Gross Motor Coordination             [] ROM           [] Delirium                   [] Motor Control      OT PLAN OF CARE   OT POC based on physician orders, patient diagnosis and results of clinical assessment     Frequency/Duration: 2-4 days/wk for

## 2025-01-28 NOTE — PROGRESS NOTES
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    has a past surgical history that includes back surgery; Breast surgery; Femur Surgery (Right, 2024); Total shoulder arthroplasty (Left); meniscectomy; and Revision total hip arthroplasty (Right, 2025).     Procedure/Surgery:  S/p conversion of right cephalomedullary nail to restoration modular total hip arthroplasty    Precautions:  Falls, WBAT (weight bearing as tolerated) RLE, Anterior hip precautions  Equipment Needs: Patient has needed equipment ,    SUBJECTIVE:    Patient lives with spouse  in a two story home able to reside first   with 3 steps to enter with 2 Rail  Bed is on 2 floor and bath is on 1 floor.  Patient ambulated independently  PTA. Equipment owned: Wheeled Walker,      OBJECTIVE:   Initial Evaluation  Date: 25 Treatment Short Term/ Long Term   Goals   AM-PAC 6 Clicks      Was pt agreeable to Eval/treatment? Yes      Does pt have pain? yes     Bed Mobility  Rolling: NT  Supine to sit:   Min   Sit to supine: Min   Scooting: Min   Rolling: Ind  Supine to sit: Ind  Sit to supine: Ind  Scooting: Ind   Transfers Sit to stand: Min to Wheeled Walker  Stand to sit: Min  Stand pivot: Min with Wheeled Walker

## 2025-01-28 NOTE — PROGRESS NOTES
CLINICAL PHARMACY NOTE: MEDS TO BEDS    Total # of Prescriptions Filled: 2   The following medications were delivered to the patient:  Aspirin 325mg  Percocet 5-325mg    Additional Documentation:  Patient's spouse picked up in pharmacy 1-28-25

## 2025-01-28 NOTE — PROGRESS NOTES
4 Eyes Skin Assessment     NAME:  Natasha Washburn  YOB: 1965  MEDICAL RECORD NUMBER:  [id number]    The patient is being assessed for  Admission    I agree that at least one RN has performed a thorough Head to Toe Skin Assessment on the patient. ALL assessment sites listed below have been assessed.      Areas assessed by both nurses:    Head, Face, Ears, Shoulders, Back, Chest, Arms, Elbows, Hands, Sacrum. Buttock, Coccyx, Ischium, and Legs. Feet and Heels        Does the Patient have a Wound? Yes wound(s) were present on assessment. LDA wound assessment was Initiated and completed by RN       Dandy Prevention initiated by RN: Yes  Wound Care Orders initiated by RN: No    Pressure Injury (Stage 3,4, Unstageable, DTI, NWPT, and Complex wounds) if present, place Wound referral order by RN under : No    New Ostomies, if present place, Ostomy referral order under : No     Nurse 1 eSignature: Electronically signed by Claudia Lainez RN on 1/27/25 at 8:59 PM EST    **SHARE this note so that the co-signing nurse can place an eSignature**    Nurse 2 eSignature: Electronically signed by Nelson Haile RN on 1/27/25 at 9:05 PM EST

## 2025-01-28 NOTE — PROGRESS NOTES
Department of Orthopedic Surgery   Progress Note    Patient seen and examined. Pain controlled.  As noted participated to therapy.  Diet is tolerated.  No acute overnight event reported.  No new complaints.  Denies chest pain, shortness of breath, calf pain, dizziness/lightheadedness, fevers or chills.     VITALS:  /72   Pulse 95   Temp 98.7 °F (37.1 °C) (Temporal)   Resp 15   Ht 1.753 m (5' 9\")   Wt 83.5 kg (184 lb)   LMP 03/01/2017   SpO2 96%   BMI 27.17 kg/m²     GENERAL: In bed, comfortable  MUSCULOSKELETAL:   right lower extremity:  Dressing C/D/I  Compartments soft and compressible, calf non-tender  Palpable dorsalis pedis and posterior tibialis pulse, brisk cap refill to toes, foot warm and perfused  Sensation intact to light touch in sural/deep peroneal/superficial peroneal/saphenous/posterior tibial nerve distributions to foot/ankle.  Demonstrates active ankle plantar/dorsiflexion/great toe extension    CBC:   Lab Results   Component Value Date/Time    WBC 5.4 01/28/2025 06:06 AM    HGB 9.3 01/28/2025 06:06 AM    HCT 29.7 01/28/2025 06:06 AM     01/28/2025 06:06 AM       ASSESSMENT  S/p conversion of right cephalomedullary nail to restoration modular total hip arthroplasty 1/27    PLAN    Weightbearing as tolerated right lower extremity-anterolateral hip precautions  DVT prophylaxis aspirin 325 mg twice a day  PT/OT as amenable  Pain control multimodal IV/p.o.  Transition to p.o. meds in anticipation to discharge later today  DC: Pending pain control and PT evaluation    Electronically signed by Len Garcia MD on 1/28/2025 at 2:11 PM       Patient doing well.  Seen this morning.  No chest pain or shortness of breath.  Right lower extremity neurovascular intact distally.  Plan up with therapy and DC home today.  Most recent hemoglobin 9.3, asymptomatic will monitor.

## 2025-01-28 NOTE — CARE COORDINATION
Care Coordination  Met with the patient at bedside to discuss transition care planning. The patient lives with her  in a 2 story home with 3 steps to enter.  Her bed and bath are on the main floor. The patient was independent prior to admission and does have a wheeled walker if needed. Her primary care physician is Chao Cortés and her pharmacy is [facility].  She was admitted a an [facility] and had a c9 with approval for the surgury. She is post op removal of orthopedic hardware from right femur. Conversion to restoration right total hip arthoplasty.  Pt [facility] 17/24 and she walked 10 feet with a wheeled walker and she has one at home. Ot [facility] 16/24. Her plan is to return home upon discharge. She was feeling dizzy and nauseated this am.             Case Management Assessment  Initial Evaluation    Date/Time of Evaluation: 1/28/2025 3:14 PM  Assessment Completed by: Mery Driscoll RN    If patient is discharged prior to next notation, then this note serves as note for discharge by case management.    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 Yes    Chart Reviewed: Yes      History Provided by: Patient  Patient Orientation: Alert and Oriented    Patient Cognition: Alert    Hospitalization in the last 30 days (Readmission):  No    If yes, Readmission Assessment in CM Navigator will be completed.    Advance Directives:      Code

## 2025-01-28 NOTE — CONSULTS
deferred    LABS:    CBC with Differential:    Lab Results   Component Value Date/Time    WBC 5.4 01/28/2025 06:06 AM    RBC 3.06 01/28/2025 06:06 AM    HGB 9.3 01/28/2025 06:06 AM    HCT 29.7 01/28/2025 06:06 AM     01/28/2025 06:06 AM    MCV 97.1 01/28/2025 06:06 AM    MCH 30.4 01/28/2025 06:06 AM    MCHC 31.3 01/28/2025 06:06 AM    RDW 13.4 01/28/2025 06:06 AM    LYMPHOPCT 18 01/28/2025 06:06 AM    MONOPCT 12 01/28/2025 06:06 AM    EOSPCT 0 01/28/2025 06:06 AM    BASOPCT 0 01/28/2025 06:06 AM    MONOSABS 0.65 01/28/2025 06:06 AM    LYMPHSABS 0.94 01/28/2025 06:06 AM    EOSABS 0.02 01/28/2025 06:06 AM    BASOSABS 0.01 01/28/2025 06:06 AM     CMP:    Lab Results   Component Value Date/Time     01/28/2025 06:06 AM    K 3.9 01/28/2025 06:06 AM     01/28/2025 06:06 AM    CO2 25 01/28/2025 06:06 AM    BUN 17 01/28/2025 06:06 AM    CREATININE 0.7 01/28/2025 06:06 AM    GFRAA >60 06/24/2022 09:10 AM    LABGLOM >90 01/28/2025 06:06 AM    LABGLOM >60 08/28/2023 04:24 PM    GLUCOSE 109 01/28/2025 06:06 AM    GLUCOSE 93 05/11/2011 11:02 AM    CALCIUM 8.9 01/28/2025 06:06 AM    BILITOT 0.7 01/22/2025 10:45 AM    ALKPHOS 147 01/22/2025 10:45 AM    AST 18 01/22/2025 10:45 AM    ALT 11 01/22/2025 10:45 AM       ASSESSMENT:      Principal Problem:    S/P revision of total hip  Active Problems:    Closed intertrochanteric fracture of right femur (HCC)    Mechanical complication due to implant and internal device    Presence of other orthopedic joint implants    Painful orthopaedic hardware (HCC)    Closed intertrochanteric fracture of hip, right, with nonunion, subsequent encounter    S/P total right hip arthroplasty  Resolved Problems:    * No resolved hospital problems. *      PLAN:    59 year old female admitted to med surg unit post op    Pain management  Monitor blood pressure, parameters on BP medications  Bowel regimen  ISP q. hour while awake  Caution with pain medications as patient is narcotic naïve  Private car

## 2025-01-28 NOTE — PROGRESS NOTES
Dr Keyona demarco served that Natasha is nauseated, dizzy when worked with PT. Only worked with PT once. In much pain. Not ready to discharge 1st day postop.

## 2025-01-29 VITALS
WEIGHT: 184 LBS | TEMPERATURE: 98.1 F | RESPIRATION RATE: 14 BRPM | OXYGEN SATURATION: 97 % | HEIGHT: 69 IN | SYSTOLIC BLOOD PRESSURE: 109 MMHG | BODY MASS INDEX: 27.25 KG/M2 | HEART RATE: 92 BPM | DIASTOLIC BLOOD PRESSURE: 62 MMHG

## 2025-01-29 LAB
ANION GAP SERPL CALCULATED.3IONS-SCNC: 10 MMOL/L (ref 7–16)
BASOPHILS # BLD: 0.02 K/UL (ref 0–0.2)
BASOPHILS NFR BLD: 0 % (ref 0–2)
BUN SERPL-MCNC: 16 MG/DL (ref 6–20)
CALCIUM SERPL-MCNC: 9 MG/DL (ref 8.6–10.2)
CHLORIDE SERPL-SCNC: 100 MMOL/L (ref 98–107)
CO2 SERPL-SCNC: 25 MMOL/L (ref 22–29)
CREAT SERPL-MCNC: 0.7 MG/DL (ref 0.5–1)
EOSINOPHIL # BLD: 0.05 K/UL (ref 0.05–0.5)
EOSINOPHILS RELATIVE PERCENT: 1 % (ref 0–6)
ERYTHROCYTE [DISTWIDTH] IN BLOOD BY AUTOMATED COUNT: 13.3 % (ref 11.5–15)
GFR, ESTIMATED: >90 ML/MIN/1.73M2
GLUCOSE SERPL-MCNC: 108 MG/DL (ref 74–99)
HCT VFR BLD AUTO: 26.3 % (ref 34–48)
HGB BLD-MCNC: 8.7 G/DL (ref 11.5–15.5)
IMM GRANULOCYTES # BLD AUTO: 0.04 K/UL (ref 0–0.58)
IMM GRANULOCYTES NFR BLD: 1 % (ref 0–5)
LYMPHOCYTES NFR BLD: 0.84 K/UL (ref 1.5–4)
LYMPHOCYTES RELATIVE PERCENT: 16 % (ref 20–42)
MCH RBC QN AUTO: 30 PG (ref 26–35)
MCHC RBC AUTO-ENTMCNC: 33.1 G/DL (ref 32–34.5)
MCV RBC AUTO: 90.7 FL (ref 80–99.9)
MONOCYTES NFR BLD: 0.51 K/UL (ref 0.1–0.95)
MONOCYTES NFR BLD: 10 % (ref 2–12)
NEUTROPHILS NFR BLD: 73 % (ref 43–80)
NEUTS SEG NFR BLD: 3.88 K/UL (ref 1.8–7.3)
PLATELET # BLD AUTO: 303 K/UL (ref 130–450)
PMV BLD AUTO: 8.7 FL (ref 7–12)
POTASSIUM SERPL-SCNC: 4.2 MMOL/L (ref 3.5–5)
RBC # BLD AUTO: 2.9 M/UL (ref 3.5–5.5)
SODIUM SERPL-SCNC: 135 MMOL/L (ref 132–146)
WBC OTHER # BLD: 5.3 K/UL (ref 4.5–11.5)

## 2025-01-29 PROCEDURE — 85025 COMPLETE CBC W/AUTO DIFF WBC: CPT

## 2025-01-29 PROCEDURE — 2500000003 HC RX 250 WO HCPCS

## 2025-01-29 PROCEDURE — 6370000000 HC RX 637 (ALT 250 FOR IP)

## 2025-01-29 PROCEDURE — 80048 BASIC METABOLIC PNL TOTAL CA: CPT

## 2025-01-29 PROCEDURE — 97530 THERAPEUTIC ACTIVITIES: CPT

## 2025-01-29 PROCEDURE — 6370000000 HC RX 637 (ALT 250 FOR IP): Performed by: INTERNAL MEDICINE

## 2025-01-29 PROCEDURE — 97535 SELF CARE MNGMENT TRAINING: CPT

## 2025-01-29 PROCEDURE — 36415 COLL VENOUS BLD VENIPUNCTURE: CPT

## 2025-01-29 RX ADMIN — CITALOPRAM HYDROBROMIDE 10 MG: 20 TABLET ORAL at 07:55

## 2025-01-29 RX ADMIN — OXYCODONE 5 MG: 5 TABLET ORAL at 15:58

## 2025-01-29 RX ADMIN — SODIUM CHLORIDE, PRESERVATIVE FREE 10 ML: 5 INJECTION INTRAVENOUS at 07:46

## 2025-01-29 RX ADMIN — OXYCODONE 5 MG: 5 TABLET ORAL at 07:45

## 2025-01-29 RX ADMIN — OXYCODONE 5 MG: 5 TABLET ORAL at 12:05

## 2025-01-29 RX ADMIN — MELOXICAM 7.5 MG: 7.5 TABLET ORAL at 09:55

## 2025-01-29 RX ADMIN — AMLODIPINE BESYLATE 5 MG: 5 TABLET ORAL at 07:45

## 2025-01-29 RX ADMIN — CITALOPRAM HYDROBROMIDE 20 MG: 20 TABLET ORAL at 07:54

## 2025-01-29 RX ADMIN — ACETAMINOPHEN 650 MG: 325 TABLET ORAL at 10:33

## 2025-01-29 RX ADMIN — ACETAMINOPHEN 650 MG: 325 TABLET ORAL at 15:59

## 2025-01-29 RX ADMIN — LOSARTAN POTASSIUM 50 MG: 50 TABLET, FILM COATED ORAL at 07:45

## 2025-01-29 RX ADMIN — ASPIRIN 325 MG: 325 TABLET, COATED ORAL at 07:44

## 2025-01-29 RX ADMIN — OXYCODONE HYDROCHLORIDE 10 MG: 10 TABLET ORAL at 02:46

## 2025-01-29 ASSESSMENT — PAIN DESCRIPTION - ORIENTATION
ORIENTATION: RIGHT;LEFT
ORIENTATION: RIGHT

## 2025-01-29 ASSESSMENT — PAIN DESCRIPTION - DESCRIPTORS
DESCRIPTORS: ACHING;DISCOMFORT
DESCRIPTORS: ACHING;DISCOMFORT;DULL
DESCRIPTORS: ACHING;DISCOMFORT;THROBBING
DESCRIPTORS: ACHING;DISCOMFORT;DULL
DESCRIPTORS: ACHING;DISCOMFORT;GNAWING
DESCRIPTORS: ACHING

## 2025-01-29 ASSESSMENT — PAIN DESCRIPTION - LOCATION
LOCATION: HIP

## 2025-01-29 ASSESSMENT — PAIN - FUNCTIONAL ASSESSMENT

## 2025-01-29 ASSESSMENT — PAIN SCALES - GENERAL
PAINLEVEL_OUTOF10: 5
PAINLEVEL_OUTOF10: 4
PAINLEVEL_OUTOF10: 4
PAINLEVEL_OUTOF10: 5
PAINLEVEL_OUTOF10: 5

## 2025-01-29 NOTE — PROGRESS NOTES
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    has a past surgical history that includes back surgery; Breast surgery; Femur Surgery (Right, 2024); Total shoulder arthroplasty (Left); meniscectomy; and Revision total hip arthroplasty (Right, 2025).     Procedure/Surgery:  S/p conversion of right cephalomedullary nail to restoration modular total hip arthroplasty    Precautions:  Falls, WBAT (weight bearing as tolerated) RLE, Anterior hip precautions  Equipment Needs: Patient has needed equipment ,    SUBJECTIVE:    Patient lives with spouse  in a two story home able to reside first   with 3 steps to enter with 2 Rail  Bed is on 2 floor and bath is on 1 floor.  Patient ambulated independently  PTA. Equipment owned: Wheeled Walker,      OBJECTIVE:   Initial Evaluation  Date: 25 Treatment  25 Short Term/ Long Term   Goals   AM-PAC 6 Clicks     Was pt agreeable to Eval/treatment? Yes  yes    Does pt have pain? yes yes    Bed Mobility  Rolling: NT  Supine to sit:   Min   Sit to supine: Min   Scooting: Min  Rolling: NT  Supine to sit:   NT   Sit to supine: Min RLE management  Scooting: Ind   Rolling: Ind  Supine to sit: Ind  Sit to supine: Ind  Scooting: Ind

## 2025-01-29 NOTE — PROGRESS NOTES
Department of Orthopedic Surgery  Resident Progress Note    Patient seen and examined. Pain controlled. No new complaints.  Denies chest pain, shortness of breath, dizziness/lightheadedness.  Her syncopal event when evaluated this morning.  She states that she had trouble with physical therapy yesterday becoming dizzy and nauseous.  After that, she states that she was able to get up back-and-forth in the bathroom about 4 times.  Today she is feeling better and hopes to be discharged.    VITALS:  /68   Pulse 94   Temp 97.8 °F (36.6 °C) (Temporal)   Resp 16   Ht 1.753 m (5' 9\")   Wt 83.5 kg (184 lb)   LMP 03/01/2017   SpO2 96%   BMI 27.17 kg/m²     General: alert and oriented to person, place and time, well-developed and well-nourished, in no acute distress    MUSCULOSKELETAL:   right lower extremity:  Dressing C/D/I  Compartments soft and compressible  +PF/DF/EHL  +2/4 DP & PT pulses, Brisk Cap refill, Toes warm and perfused  Distal sensation grossly intact to Peroneals, Sural, Saphenous, and tibial nrs    CBC:   Lab Results   Component Value Date/Time    WBC 5.4 01/28/2025 06:06 AM    HGB 9.3 01/28/2025 06:06 AM    HCT 29.7 01/28/2025 06:06 AM     01/28/2025 06:06 AM     PT/INR:    Lab Results   Component Value Date/Time    PROTIME 10.4 01/22/2025 10:45 AM    INR 1.0 01/22/2025 10:45 AM         ASSESSMENT  S/P restoration modular total hip arthroplasty-1/27/2025    PLAN      Continue physical therapy and protocol: WBAT -right LE  24 hour abx coverage  Deep venous thrombosis prophylaxis -aspirin 325 twice daily, early mobilization  Patient is okay to be discharged from orthopedic standpoint  PT/OT  Pain Control: IV and PO  Monitor H&H  D/C Plan: Home

## 2025-01-29 NOTE — CARE COORDINATION
Care Coordination  The patient is post op removal of orthopedic hardware from right femur. Conversion to restoration right total hip arthoplasty. The patient is post op day #2 . Pt 19/24 and she walked 80 feet with a wheeled walker sba. She has a wheeled walker already at home[facility] Noted [facility] care orders and a referral was made to [facility] await if accepted . Will need a c9 for home pt ot sent to [facility]. Have left a message for the patients [facility] nurse to call back to check on this. Her name is gerri phone is [phone] and claim number is [id number]. Spoke to [facility]  and the C9 was already faxed to the [employer]  @ fax [phone] for approval. [facility] can accept the patient when C9 approved.       Addendum-  Spoke to the patients cm at [facility] gerri her actual phone is [phone] . Spoke to gerri they have her c9 from today but it will take up to 72 hrs to review it and get it approved. [facility] will not take the patient without an approved c9. So a referral was made to [facility] and awaiting a call back from the nurse to see if they can accept the patient until c9 approved.   Have spoke to [facility] x 2 still awaiting a final answer if they can accept.

## 2025-01-29 NOTE — CARE COORDINATION
reached out to patients PCP office Dr Landon Cortés at [phone] to schedule follow up D/C appointment.  spoke to office staff and scheduled an appointment on 2/11 at 9:00 AM in office.     Please bring your discharge paperwork, new medications, ID, insurance card and co-pay if you have one.    Information added to D/C summary.

## 2025-01-29 NOTE — PROGRESS NOTES
Occupational Therapy  OT BEDSIDE TREATMENT NOTE   [facility]  [address], [place]        Date:2025  Patient Name: Natasha Washburn  MRN: [id number]  : 1965  Room: 5404/5404-     Per OT Eval:    Evaluating OT:Marsha Arteaga, OTR/L   License #  [id number]        Referring Provider:     Cory Miles DO        Specific Provider Orders/Date: OT evaluation & treatment        Diagnosis: Closed intertrochanteric fracture of right femur (HCC) [S72.141A]  Mechanical complication due to implant and internal device [T85.698A]  Presence of other orthopedic joint implants [Z96.698]  Painful orthopaedic hardware (HCC) [T84.84XA]  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    Surgery: 25: S/p conversion of right cephalomedullary nail to restoration modular total hip arthroplasty      Past Surgical History:  has a past surgical history that includes back surgery; Breast surgery; Femur Surgery (Right, 2024); Total shoulder arthroplasty (Left); meniscectomy; and Revision total hip arthroplasty (Right, 2025).       Precautions:  Fall Risk, WBAT R LE, anterolateral hip prec.      Assessment of current deficits    [x] Functional mobility            [x]ADLs           [x] Strength                  [x]Cognition    [x] Functional transfers          [x] IADLs         [x] Safety Awareness   [x]Endurance    [x] Fine Coordination                         [x] Balance      [] Vision/perception   [x]Sensation      []Gross Motor Coordination             [] ROM           [] Delirium                   [] Motor Control      OT PLAN OF CARE   OT POC based on physician orders, patient diagnosis and results of clinical assessment     Frequency/Duration: 2-4 days/wk for 2 weeks PRN

## 2025-01-29 NOTE — PROGRESS NOTES
Patient received the Sacrament of the Anointing of the Sick by Father Sergio Levine on Tuesday, January 28, 2025.    If additional support is requested or needed please reach out to Spiritual Health ([email or web address]).    [place] MDIV, BCC

## 2025-01-30 LAB — SURGICAL PATHOLOGY REPORT: NORMAL

## 2025-01-30 NOTE — DISCHARGE SUMMARY
Physician Discharge Summary     Patient ID:  Natasha Washburn  [id number]  59 y.o.  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 The patient was subsequently taken to the PACU and to the floor in stable condition. The patient continued antibiotics 24 hours postoperatively, as they received a dose of antibiotics preoperatively for infection prophylaxis. The patient was to begin physical therapy, WBAT, the day of surgery. The patient was also started on DVT prophylaxis. Blood counts were followed daily. On post-op day 2, the dressing was changed, revealing the wound to be benign in appearance without obvious signs of infection including erythema or purulence. The burton was discontinued. Pain medications were transitioned to oral medication. was consulted for D/C planning as the patient made appropriate gains with PT in regaining independent function. On POD 2, the patient was discharged to HOME in stable condition.    Treatments: s/P Conversion of right cephalomedullary nail to restoration modular total hip

## 2025-01-31 DIAGNOSIS — Z96.698 PRESENCE OF OTHER ORTHOPEDIC JOINT IMPLANTS: Primary | ICD-10-CM

## 2025-01-31 DIAGNOSIS — S72.142A INTERTROCHANTERIC FRACTURE OF LEFT HIP, CLOSED, INITIAL ENCOUNTER (HCC): ICD-10-CM

## 2025-01-31 NOTE — PROGRESS NOTES
Please place [facility] order for [place].    Electronically signed by Humaira Renteria ATC on 1/31/2025 at 4:36 PM

## 2025-02-04 DIAGNOSIS — S72.142A INTERTROCHANTERIC FRACTURE OF LEFT HIP, CLOSED, INITIAL ENCOUNTER (HCC): ICD-10-CM

## 2025-02-04 DIAGNOSIS — Z96.698 PRESENCE OF OTHER ORTHOPEDIC JOINT IMPLANTS: Primary | ICD-10-CM

## 2025-02-05 DIAGNOSIS — S72.141K CLOSED INTERTROCHANTERIC FRACTURE OF HIP, RIGHT, WITH NONUNION, SUBSEQUENT ENCOUNTER: ICD-10-CM

## 2025-02-05 RX ORDER — OXYCODONE AND ACETAMINOPHEN 5; 325 MG/1; MG/1
1 TABLET ORAL EVERY 8 HOURS PRN
Qty: 21 TABLET | Refills: 0 | Status: SHIPPED | OUTPATIENT
Start: 2025-02-05 | End: 2025-02-12

## 2025-02-05 NOTE — TELEPHONE ENCOUNTER
Received call from patient requesting refill of Percocet 5/325 mg at [facility].    Date of Procedure: 1/27/2025     Procedure:  Conversion of right cephalomedullary nail to restoration modular total hip arthroplasty     Surgeon:  Len Garcia MD    Weeks from Surgery: 1      Medication pended and routed to providers for decision and signature.    Future Appointments   Date Time Provider Department Center   2/10/2025  8:00 AM SCHEDULE, [facility]   3/28/2025  9:00 AM SCHEDULE, [facility] [place] [facility]   3/28/2025  9:15 AM Newton Atkins DO [facility]       Electronically signed by Nelly Yang RN on 2/5/2025 at 9:39 AM

## 2025-02-05 NOTE — TELEPHONE ENCOUNTER
Controlled Substance Monitoring:    Acute and Chronic Pain Monitoring:   RX Monitoring Periodic Controlled Substance Monitoring   2/5/2025   9:52 AM No signs of potential drug abuse or diversion identified.

## 2025-02-10 ENCOUNTER — HOSPITAL ENCOUNTER (OUTPATIENT)
Dept: GENERAL RADIOLOGY | Age: 60
Discharge: HOME | End: 2025-02-12
Payer: COMMERCIAL

## 2025-02-10 ENCOUNTER — OFFICE VISIT (OUTPATIENT)
Dept: ORTHOPEDIC SURGERY | Age: 60
End: 2025-02-10
Payer: COMMERCIAL

## 2025-02-10 VITALS
OXYGEN SATURATION: 97 % | RESPIRATION RATE: 16 BRPM | HEART RATE: 84 BPM | TEMPERATURE: 98.9 F | SYSTOLIC BLOOD PRESSURE: 123 MMHG | DIASTOLIC BLOOD PRESSURE: 79 MMHG

## 2025-02-10 DIAGNOSIS — S72.142A INTERTROCHANTERIC FRACTURE OF LEFT HIP, CLOSED, INITIAL ENCOUNTER (HCC): ICD-10-CM

## 2025-02-10 DIAGNOSIS — S72.141K CLOSED INTERTROCHANTERIC FRACTURE OF HIP, RIGHT, WITH NONUNION, SUBSEQUENT ENCOUNTER: Primary | ICD-10-CM

## 2025-02-10 PROCEDURE — 73502 X-RAY EXAM HIP UNI 2-3 VIEWS: CPT

## 2025-02-10 PROCEDURE — 99213 OFFICE O/P EST LOW 20 MIN: CPT | Performed by: PHYSICIAN ASSISTANT

## 2025-02-10 PROCEDURE — 73552 X-RAY EXAM OF FEMUR 2/>: CPT

## 2025-02-10 PROCEDURE — 99024 POSTOP FOLLOW-UP VISIT: CPT | Performed by: PHYSICIAN ASSISTANT

## 2025-02-10 RX ORDER — METHOCARBAMOL 750 MG/1
750 TABLET, FILM COATED ORAL 3 TIMES DAILY
Qty: 90 TABLET | Refills: 0 | Status: SHIPPED | OUTPATIENT
Start: 2025-02-10 | End: 2025-03-12

## 2025-02-10 NOTE — PATIENT INSTRUCTIONS
Removed staples  Steri strips should fall off in the shower at some point, if they do not fall off in 10 days, remove them  Dressing: Can remove dressing in 1-2 days then open to air  Can shower tomorrow over incision. NO Soaking or submerging incision in water until fully healed & all scabs are gone    WB:  Weight bearing as tolerated on right lower extremity    Anterolateral Hip Precautions    Do not step backwards with surgical leg. No hip extension.  Do not allow surgical leg to externally rotate (turn outwards).  Do not cross your legs. Use a pillow between legs when rolling.  Do not move your leg out to the side. No hip abduction.       Therapy: Attend therapy following the Total Hip- Anterolateral Dislocation Precautions protocol at the 2 week vincenzo      DVT: Continue with  mg BID as ordered for 28 days  Pain control: Robaxin sent to the pharmacy    Continue with ice to the injured extremity 2-3 times per day for swelling  If able continue with elevation and compression    Follow up in 4 weeks with XR of the right hip and femur to be done in office

## 2025-02-10 NOTE — PROGRESS NOTES
Chief Complaint   Patient presents with    Post-Op Check     2wk po R hip MISTY, Conversation to R SARAH, DOS 1/27/2025. Ambulating with walker. States pain is 3/10.        OP:SURGEON: Dr. Len Garcia MD  DATE OF PROCEDURE: 1/27/25  PROCEDURE:Conversion of right cephalomedullary nail to restoration modular total hip arthroplasty    POD: 2 weeks    Subjective:  Natasha Washburn is following up from the above surgery. She is WBAT on right lower extremity. She ambulates with assistive device, walker.  Pain to extremity is moderate and is  taking prescribed pain medication, Percocet 5/325 mg. She is reporting muscle tightness. They denies numbness, tingling to the right lower extremity. Denies calf pain, chest pain, or shortness of breath.  Patient continues to use DVT prophylaxis,  mg BID. Patient is  participating in therapy, home health care . She is ready to start outpatient therapy next week.      Review of Systems -  All pertinent positives/negative in HPI     Objective:    General: Alert and oriented X 3, normocephalic atraumatic, external ears and eye normal, sclera clear, no acute distress, respirations easy and unlabored with no audible wheezes, skin warm and dry, speech and dress appropriate for noted age, affect euthymic.    Extremity:  Right Lower Extremity  Skin clean dry and intact, without signs of infection  Incision healing well with staples in place.   mild edema noted  Compartments supple throughout thigh and leg  Calf supple and not tender  negative Homans  Demonstrates active knee flexion and extension and ankle DF/PF  States sensation intact to touch in sural, deep peroneal, superficial peroneal, saphenous, posterior tibial  nerve distributions to foot/ankle.  Palpable dorsalis pedis and posterior tibialis pulses, cap refill brisk in toes, foot warm/perfused.      /79 (Site: Left Upper Arm, Position: Sitting, Cuff Size: Medium Adult)   Pulse 84   Temp 98.9 °F (37.2 °C)   Resp 16

## 2025-02-23 ASSESSMENT — HOOS JR
RISING FROM SITTING: MILD
GOING UP OR DOWN STAIRS: MILD
HOOS JR RAW SCORE: 7
HOOS JR TOTAL INTERVAL SCORE: 67.516
LYING IN BED (TURNING OVER, MAINTAINING HIP POSITION): MODERATE
BENDING TO THE FLOOR TO PICK UP OBJECT: MILD
HOOS JR RAW SCORE: 7
WALKING ON UNEVEN SURFACE: MODERATE

## 2025-02-23 ASSESSMENT — PROMIS GLOBAL HEALTH SCALE
IN GENERAL, HOW WOULD YOU RATE YOUR PHYSICAL HEALTH [ON A SCALE OF 1 (POOR) TO 5 (EXCELLENT)]?: GOOD
IN THE PAST 7 DAYS, HOW WOULD YOU RATE YOUR PAIN ON AVERAGE [ON A SCALE FROM 0 (NO PAIN) TO 10 (WORST IMAGINABLE PAIN)]?: 1
IN THE PAST 7 DAYS, HOW OFTEN HAVE YOU BEEN BOTHERED BY EMOTIONAL PROBLEMS, SUCH AS FEELING ANXIOUS, DEPRESSED, OR IRRITABLE [ON A SCALE FROM 1 (NEVER) TO 5 (ALWAYS)]?: SOMETIMES
TO WHAT EXTENT ARE YOU ABLE TO CARRY OUT YOUR EVERYDAY PHYSICAL ACTIVITIES SUCH AS WALKING, CLIMBING STAIRS, CARRYING GROCERIES, OR MOVING A CHAIR [ON A SCALE OF 1 (NOT AT ALL) TO 5 (COMPLETELY)]?: MODERATELY
IN GENERAL, PLEASE RATE HOW WELL YOU CARRY OUT YOUR USUAL SOCIAL ACTIVITIES (INCLUDES ACTIVITIES AT HOME, AT WORK, AND IN YOUR COMMUNITY, AND RESPONSIBILITIES AS A PARENT, CHILD, SPOUSE, EMPLOYEE, FRIEND, ETC) [ON A SCALE OF 1 (POOR) TO 5 (EXCELLENT)]?: VERY GOOD
IN GENERAL, WOULD YOU SAY YOUR QUALITY OF LIFE IS...[ON A SCALE OF 1 (POOR) TO 5 (EXCELLENT)]: VERY GOOD
HOW IS THE PROMIS V1.1 BEING ADMINISTERED?: ELECTRONIC
IN GENERAL, WOULD YOU SAY YOUR HEALTH IS...[ON A SCALE OF 1 (POOR) TO 5 (EXCELLENT)]: GOOD
IN GENERAL, HOW WOULD YOU RATE YOUR MENTAL HEALTH, INCLUDING YOUR MOOD AND YOUR ABILITY TO THINK [ON A SCALE OF 1 (POOR) TO 5 (EXCELLENT)]?: GOOD
SUM OF RESPONSES TO QUESTIONS 3, 6, 7, & 8: 10
IN THE PAST 7 DAYS, HOW WOULD YOU RATE YOUR FATIGUE ON AVERAGE [ON A SCALE FROM 1 (NONE) TO 5 (VERY SEVERE)]?: MODERATE
SUM OF RESPONSES TO QUESTIONS 2, 4, 5, & 10: 13
IN GENERAL, HOW WOULD YOU RATE YOUR SATISFACTION WITH YOUR SOCIAL ACTIVITIES AND RELATIONSHIPS [ON A SCALE OF 1 (POOR) TO 5 (EXCELLENT)]?: GOOD

## 2025-03-04 DIAGNOSIS — S72.141K CLOSED INTERTROCHANTERIC FRACTURE OF HIP, RIGHT, WITH NONUNION, SUBSEQUENT ENCOUNTER: Primary | ICD-10-CM

## 2025-03-10 ENCOUNTER — HOSPITAL ENCOUNTER (OUTPATIENT)
Dept: GENERAL RADIOLOGY | Age: 60
Discharge: HOME | End: 2025-03-12
Payer: COMMERCIAL

## 2025-03-10 ENCOUNTER — OFFICE VISIT (OUTPATIENT)
Dept: ORTHOPEDIC SURGERY | Age: 60
End: 2025-03-10
Payer: COMMERCIAL

## 2025-03-10 VITALS
SYSTOLIC BLOOD PRESSURE: 135 MMHG | HEART RATE: 71 BPM | OXYGEN SATURATION: 99 % | DIASTOLIC BLOOD PRESSURE: 84 MMHG | RESPIRATION RATE: 16 BRPM

## 2025-03-10 DIAGNOSIS — S72.141K CLOSED INTERTROCHANTERIC FRACTURE OF HIP, RIGHT, WITH NONUNION, SUBSEQUENT ENCOUNTER: ICD-10-CM

## 2025-03-10 DIAGNOSIS — S72.141K CLOSED INTERTROCHANTERIC FRACTURE OF HIP, RIGHT, WITH NONUNION, SUBSEQUENT ENCOUNTER: Primary | ICD-10-CM

## 2025-03-10 PROCEDURE — 99212 OFFICE O/P EST SF 10 MIN: CPT | Performed by: PHYSICIAN ASSISTANT

## 2025-03-10 PROCEDURE — 99024 POSTOP FOLLOW-UP VISIT: CPT | Performed by: PHYSICIAN ASSISTANT

## 2025-03-10 PROCEDURE — 73502 X-RAY EXAM HIP UNI 2-3 VIEWS: CPT

## 2025-03-10 PROCEDURE — 73552 X-RAY EXAM OF FEMUR 2/>: CPT

## 2025-03-10 NOTE — PROGRESS NOTES
Chief Complaint   Patient presents with    Post-Op Check     6wk PO R Hip MISTY to R SARAH. Pt ambulating with single cane. Pt states no pain.         OP:SURGEON: Dr. Len Garcia MD  DATE OF PROCEDURE: 1-27-25  PROCEDURE: Conversion of right cephalomedullary nail to restoration modular total hip arthroplasty     POD: 6 weeks    Subjective:  Natasha Washburn is following up from the above surgery. She is WBAT on right lower extremity. She ambulates with assistive device, cane.  Pain to extremity is none and is not taking prescribed pain medication. They denies numbness or tingling to the right lower extremity. Denies calf pain, chest pain, or shortness of breath.  Patient has finished DVT prophylaxis. Patient is  participating in therapy, outpatient therapy.  Patient is doing well after surgery.  States that she is making progress in PT.  She feels that her right leg is getting stronger.     Review of Systems -  All pertinent positives/negative in HPI     Objective:    General: Alert and oriented X 3, normocephalic atraumatic, external ears and eye normal, sclera clear, no acute distress, respirations easy and unlabored with no audible wheezes, skin warm and dry, speech and dress appropriate for noted age, affect euthymic.    Extremity:  Right Lower Extremity  Skin clean dry and intact, without signs of infection   Incision well-healed  no edema noted  Compartments supple throughout thigh and leg  Calf supple and not tender  negative Homans  Demonstrates active motion with HF, knee flexion/extension and ankle DF/PF  Ambulates with a cane for support.  States sensation intact to touch in sural, deep peroneal, superficial peroneal, saphenous, posterior tibial  nerve distributions to foot/ankle.  Palpable dorsalis pedis and posterior tibialis pulses, cap refill brisk in toes, foot warm/perfused.    /84 (BP Site: Left Upper Arm, Patient Position: Sitting, BP Cuff Size: Medium Adult)   Pulse 71   Resp 16   LMP

## 2025-03-10 NOTE — PATIENT INSTRUCTIONS
Weightbearing as tolerated right lower extremity.    Continue therapy.    Follow-up in 6 weeks for reevaluation and x-rays.    Call if any questions or concerns

## 2025-03-28 ENCOUNTER — PROCEDURE VISIT (OUTPATIENT)
Dept: AUDIOLOGY | Age: 60
End: 2025-03-28
Payer: COMMERCIAL

## 2025-03-28 ENCOUNTER — OFFICE VISIT (OUTPATIENT)
Dept: ENT CLINIC | Age: 60
End: 2025-03-28
Payer: COMMERCIAL

## 2025-03-28 VITALS — WEIGHT: 185 LBS | HEIGHT: 69 IN | BODY MASS INDEX: 27.4 KG/M2

## 2025-03-28 DIAGNOSIS — H90.41 SENSORINEURAL HEARING LOSS (SNHL) OF RIGHT EAR WITH UNRESTRICTED HEARING OF LEFT EAR: Primary | ICD-10-CM

## 2025-03-28 DIAGNOSIS — H93.19 TINNITUS, UNSPECIFIED LATERALITY: ICD-10-CM

## 2025-03-28 DIAGNOSIS — H90.41 SENSORINEURAL HEARING LOSS, UNILATERAL, RIGHT EAR, WITH UNRESTRICTED HEARING ON THE CONTRALATERAL SIDE: Primary | ICD-10-CM

## 2025-03-28 PROCEDURE — 3017F COLORECTAL CA SCREEN DOC REV: CPT | Performed by: OTOLARYNGOLOGY

## 2025-03-28 PROCEDURE — 92567 TYMPANOMETRY: CPT

## 2025-03-28 PROCEDURE — G8419 CALC BMI OUT NRM PARAM NOF/U: HCPCS | Performed by: OTOLARYNGOLOGY

## 2025-03-28 PROCEDURE — 99213 OFFICE O/P EST LOW 20 MIN: CPT | Performed by: OTOLARYNGOLOGY

## 2025-03-28 PROCEDURE — 1036F TOBACCO NON-USER: CPT | Performed by: OTOLARYNGOLOGY

## 2025-03-28 PROCEDURE — 92557 COMPREHENSIVE HEARING TEST: CPT

## 2025-03-28 PROCEDURE — G8427 DOCREV CUR MEDS BY ELIG CLIN: HCPCS | Performed by: OTOLARYNGOLOGY

## 2025-03-28 NOTE — PROGRESS NOTES
This patient was referred for audiometric and tympanometric testing by Dr. Atkins due to hearing loss.     Audiometry using pure tone air and bone conduction testing revealed a mild sensorineural hearing loss through 1000 Hz, rising to within normal limits, in the right ear. The left ear revealed hearing sensitivity within normal limits. Reliability was good. Speech reception thresholds were in good agreement with the pure tone averages, bilaterally. Speech discrimination scores were excellent, bilaterally.    Tympanometry revealed normal middle ear peak pressure and compliance, bilaterally.    The results were reviewed with the patient and ordering provider.     Recommendations for follow up will be made pending ordering provider consult.    Maryjo Amaro/CCC-A  [place] Lic [id number]  Electronically signed by Maryjo Amaro on 3/28/2025 at 11:09 AM

## 2025-03-28 NOTE — PROGRESS NOTES
[facility]  Dr. Newton Atkins D.O. Ms.Ed.  New Consult       Patient Name:  Natasha Washburn  :  1965     CHIEF C/O:    Chief Complaint   Patient presents with    Follow-up     Pt states hearing seems the same to here        HISTORY OBTAINED FROM:  patient    HISTORY OF PRESENT ILLNESS:       Natasha is a 60 y.o. year old female, here today for:       Here for 1 year follow up. Hearing has been stable, no new symptoms.            Past Medical History:   Diagnosis Date    Anxiety     Hypertension     Nondependent alcohol abuse     Sleep apnea     cpap at      Past Surgical History:   Procedure Laterality Date    BACK SURGERY      BREAST SURGERY      FEMUR SURGERY Right 2024    RIGHT FEMUR OPEN REDUCTION INTERNAL FIXATION performed by Evin Thrasher DO at [facility] OR    MENISCECTOMY      2019    REVISION TOTAL HIP ARTHROPLASTY Right 2025    REMOVAL OF CURRENT ORTHOPEDIC HARDWARE FROM RIGHT FEMUR, CONVERSION TO RESTORATION MODULAR RIGHT TOTAL HIP ARTHROPLASTY performed by Len Garcia MD at [facility] [place]    SHOULDER ARTHROPLASTY Left     2022       Current Outpatient Medications:     citalopram (CELEXA) 10 MG tablet, Take 1 tablet by mouth daily, Disp: 90 tablet, Rfl: 1    citalopram (CELEXA) 20 MG tablet, Take 1 tablet by mouth daily, Disp: 90 tablet, Rfl: 1    amLODIPine (NORVASC) 5 MG tablet, Take 1 tablet by mouth daily, Disp: 90 tablet, Rfl: 1    losartan (COZAAR) 50 MG tablet, Take 1 tablet by mouth daily, Disp: 90 tablet, Rfl: 1    Multiple Vitamins-Minerals (MULTIVITAMIN WOMEN 50+) TABS, Take 1 tablet by mouth daily, Disp: , Rfl:     Glucosamine-Chondroitin (GLUCOSAMINE CHONDR COMPLEX PO), Take 1,200 mg by mouth daily Takes 2 tablets daily, Disp: , Rfl:     Misc. Devices (CPAP MACHINE) MISC, by Does not apply route continuous 10, Disp: , Rfl:     estrogens conjugated (PREMARIN) 0.625 MG/GM CREA vaginal cream, Place 0.5 g vaginally Twice a Week (Patient

## 2025-04-18 DIAGNOSIS — Z96.641 S/P TOTAL RIGHT HIP ARTHROPLASTY: Primary | ICD-10-CM

## 2025-04-23 ENCOUNTER — HOSPITAL ENCOUNTER (OUTPATIENT)
Dept: GENERAL RADIOLOGY | Age: 60
Discharge: HOME | End: 2025-04-25

## 2025-04-23 ENCOUNTER — OFFICE VISIT (OUTPATIENT)
Dept: ORTHOPEDIC SURGERY | Age: 60
End: 2025-04-23
Payer: COMMERCIAL

## 2025-04-23 VITALS
DIASTOLIC BLOOD PRESSURE: 91 MMHG | HEIGHT: 69 IN | OXYGEN SATURATION: 97 % | RESPIRATION RATE: 20 BRPM | BODY MASS INDEX: 27.4 KG/M2 | SYSTOLIC BLOOD PRESSURE: 154 MMHG | WEIGHT: 184.97 LBS | HEART RATE: 93 BPM | TEMPERATURE: 97.5 F

## 2025-04-23 DIAGNOSIS — Z96.641 S/P TOTAL RIGHT HIP ARTHROPLASTY: ICD-10-CM

## 2025-04-23 DIAGNOSIS — S72.141K CLOSED INTERTROCHANTERIC FRACTURE OF HIP, RIGHT, WITH NONUNION, SUBSEQUENT ENCOUNTER: Primary | ICD-10-CM

## 2025-04-23 PROCEDURE — 99212 OFFICE O/P EST SF 10 MIN: CPT | Performed by: PHYSICIAN ASSISTANT

## 2025-04-23 PROCEDURE — 99024 POSTOP FOLLOW-UP VISIT: CPT | Performed by: PHYSICIAN ASSISTANT

## 2025-04-23 PROCEDURE — 73502 X-RAY EXAM HIP UNI 2-3 VIEWS: CPT

## 2025-04-23 RX ORDER — METHOCARBAMOL 750 MG/1
750 TABLET, FILM COATED ORAL 3 TIMES DAILY
Qty: 90 TABLET | Refills: 0 | Status: SHIPPED | OUTPATIENT
Start: 2025-04-23 | End: 2025-05-23

## 2025-04-23 NOTE — PROGRESS NOTES
Chief Complaint   Patient presents with    Follow-up      12 wk po R hip MISTY, Conversation to R SARAH, DOS 1/27/2025; TTS (*GP ends 4-). Patient states pain lvl 2          OP:SURGEON: Dr. Len Garcia MD  DATE OF PROCEDURE: 1-27-25  PROCEDURE: Conversion of right cephalomedullary nail to restoration modular total hip arthroplasty     POD: 12 weeks    Subjective:  Natasha Washburn is following up from the above surgery. She is WBAT on right lower extremity. She ambulates with no assistive device.  Pain to extremity is mild. Most of her pain is related to her left knee. She is having a left TKA in June. She has been taking robaxin occasionally. She is asking for a refill. They denies numbness, tingling to the right lower extremity. Denies calf pain, chest pain, or shortness of breath.  Patient has finished DVT prophylaxis. She is very happy with her right hip.      Review of Systems -  All pertinent positives/negative in HPI     Objective:    General: Alert and oriented X 3, normocephalic atraumatic, external ears and eye normal, sclera clear, no acute distress, respirations easy and unlabored with no audible wheezes, skin warm and dry, speech and dress appropriate for noted age, affect euthymic.    Extremity:  Right Lower Extremity  Skin clean dry and intact, without signs of infection  Incision healing well with no erythema or signs of infection  mild edema noted  Compartments supple throughout thigh and leg  Calf supple and not tender  negative Homans  Demonstrates active hip flexion, IR/ER, active knee flexion and extension and ankle DF/PF  States sensation intact to touch in sural, deep peroneal, superficial peroneal, saphenous, posterior tibial  nerve distributions to foot/ankle.  Palpable dorsalis pedis and posterior tibialis pulses, cap refill brisk in toes, foot warm/perfused.      BP (!) 154/91 (BP Site: Left Upper Arm, Patient Position: Sitting, BP Cuff Size: Medium Adult)   Pulse 93   Temp 97.5

## 2025-05-19 ENCOUNTER — HOSPITAL ENCOUNTER (OUTPATIENT)
Age: 60
Discharge: HOME | End: 2025-05-19
Payer: COMMERCIAL

## 2025-05-19 PROCEDURE — 93005 ELECTROCARDIOGRAM TRACING: CPT | Performed by: INTERNAL MEDICINE

## 2025-05-20 LAB
EKG ATRIAL RATE: 76 BPM
EKG P AXIS: 33 DEGREES
EKG P-R INTERVAL: 150 MS
EKG Q-T INTERVAL: 406 MS
EKG QRS DURATION: 92 MS
EKG QTC CALCULATION (BAZETT): 456 MS
EKG R AXIS: 16 DEGREES
EKG T AXIS: 22 DEGREES
EKG VENTRICULAR RATE: 76 BPM

## 2025-08-01 DIAGNOSIS — S72.141K CLOSED INTERTROCHANTERIC FRACTURE OF HIP, RIGHT, WITH NONUNION, SUBSEQUENT ENCOUNTER: Primary | ICD-10-CM

## 2025-08-06 ENCOUNTER — HOSPITAL ENCOUNTER (OUTPATIENT)
Dept: GENERAL RADIOLOGY | Age: 60
Discharge: HOME | End: 2025-08-08
Payer: COMMERCIAL

## 2025-08-06 ENCOUNTER — OFFICE VISIT (OUTPATIENT)
Age: 60
End: 2025-08-06
Payer: COMMERCIAL

## 2025-08-06 VITALS
RESPIRATION RATE: 20 BRPM | DIASTOLIC BLOOD PRESSURE: 97 MMHG | SYSTOLIC BLOOD PRESSURE: 152 MMHG | HEART RATE: 74 BPM | TEMPERATURE: 98.8 F | OXYGEN SATURATION: 96 %

## 2025-08-06 DIAGNOSIS — S72.141K CLOSED INTERTROCHANTERIC FRACTURE OF HIP, RIGHT, WITH NONUNION, SUBSEQUENT ENCOUNTER: ICD-10-CM

## 2025-08-06 DIAGNOSIS — S72.141K CLOSED INTERTROCHANTERIC FRACTURE OF HIP, RIGHT, WITH NONUNION, SUBSEQUENT ENCOUNTER: Primary | ICD-10-CM

## 2025-08-06 PROCEDURE — 3080F DIAST BP >= 90 MM HG: CPT | Performed by: ORTHOPAEDIC SURGERY

## 2025-08-06 PROCEDURE — 73552 X-RAY EXAM OF FEMUR 2/>: CPT

## 2025-08-06 PROCEDURE — 99213 OFFICE O/P EST LOW 20 MIN: CPT | Performed by: ORTHOPAEDIC SURGERY

## 2025-08-06 PROCEDURE — 3077F SYST BP >= 140 MM HG: CPT | Performed by: ORTHOPAEDIC SURGERY

## 2025-08-06 PROCEDURE — 73502 X-RAY EXAM HIP UNI 2-3 VIEWS: CPT

## (undated) DEVICE — SURGICAL PROCEDURE PACK ORTH IV ECLIPSE STRL

## (undated) DEVICE — APPLICATOR MEDICATED 26 CC SOLUTION HI LT ORNG CHLORAPREP

## (undated) DEVICE — BANDAGE COMPR M W6INXL10YD WHT BGE VELC E MTRX HK AND LOOP

## (undated) DEVICE — YANKAUER,BULB TIP,W/O VENT,RIGID,STERILE: Brand: MEDLINE

## (undated) DEVICE — BIT DRL 3.3X40 MM TRIDENT II DISP

## (undated) DEVICE — DRESSING,GAUZE,XEROFORM,CURAD,5"X9",ST: Brand: CURAD

## (undated) DEVICE — 450 ML BOTTLE OF 0.05% CHLORHEXIDINE GLUCONATE IN 99.95% STERILE WATER FOR IRRIGATION, USP AND APPLICATOR.: Brand: IRRISEPT ANTIMICROBIAL WOUND LAVAGE

## (undated) DEVICE — SOLUTION IRRIG 1000ML 0.9% SOD CHL USP POUR PLAS BTL

## (undated) DEVICE — ACTIVATION TOOL

## (undated) DEVICE — Device

## (undated) DEVICE — BASIC DOUBLE BASIN 2-LF: Brand: MEDLINE INDUSTRIES, INC.

## (undated) DEVICE — DRILL BIT 2.8MM (7/64'') X 128.0MM

## (undated) DEVICE — BANDAGE NL COFLEX 4INX5YD: Brand: MEDLINE INDUSTRIES, INC.

## (undated) DEVICE — COVER,LIGHT HANDLE,FLX,1/PK: Brand: MEDLINE INDUSTRIES, INC.

## (undated) DEVICE — DRESSING HYDROFIBER AQUACEL AG ADVANTAGE 3.5X12 IN

## (undated) DEVICE — SOLUTION IRRIG 3000ML 0.9% SOD CHL USP UROMATIC PLAS CONT

## (undated) DEVICE — DRESSING GZ W1XL8IN COT XRFRM N ADH OVERWRAP CURAD

## (undated) DEVICE — STRAP POS MP 30X3 IN HK LOOP CLOSURE FOAM DISP

## (undated) DEVICE — SPONGE LAP W18XL18IN WHT COT 4 PLY FLD STRUNG RADPQ DISP ST 2 PER PACK

## (undated) DEVICE — SYRINGE IRRIG 60ML SFT PLIABLE BLB EZ TO GRP 1 HND USE W/

## (undated) DEVICE — STRYKER PERFORMANCE SERIES SAGITTAL BLADE: Brand: STRYKER PERFORMANCE SERIES

## (undated) DEVICE — BANDAGE COMPR FOAM 5 YDX6 IN TAN STRL COFLX

## (undated) DEVICE — GLOVE ORTHO 8   MSG9480

## (undated) DEVICE — 3M™ IOBAN™ 2 ANTIMICROBIAL INCISE DRAPE 6651EZ: Brand: IOBAN™ 2

## (undated) DEVICE — WIPES SKIN CLOTH READYPREP 9 X 10.5 IN 2% CHLORHEX GLUCONATE CHG PREOP

## (undated) DEVICE — SPONGE,LAP,12"X12",XR,ST,5/PK,40PK/CS: Brand: MEDLINE

## (undated) DEVICE — SOLUTION WND IRRIGATION 450 ML 0.5 PVP-I 0.9 NACL

## (undated) DEVICE — DRAPE C ARM W41XL65IN UNIV W/ CLP AND RUBBERBAND

## (undated) DEVICE — 3M™ IOBAN™ 2 ANTIMICROBIAL INCISE DRAPE 6650EZ: Brand: IOBAN™ 2

## (undated) DEVICE — GAUZE,SPONGE,4"X4",16PLY,STRL,LF,10/TRAY: Brand: MEDLINE

## (undated) DEVICE — SYRINGE MED 50ML LUERLOCK TIP

## (undated) DEVICE — ELECTRODE PT RET AD L9FT HI MOIST COND ADH HYDRGEL CORDED

## (undated) DEVICE — 3M™ STERI-DRAPE™ U-DRAPE 1015: Brand: STERI-DRAPE™

## (undated) DEVICE — PAD,ABDOMINAL,5"X9",ST,LF,25/BX: Brand: MEDLINE INDUSTRIES, INC.

## (undated) DEVICE — PACK,BASIC I: Brand: MEDLINE

## (undated) DEVICE — GLOVE ORANGE PI 8   MSG9080

## (undated) DEVICE — SHEET,DRAPE,70X100,STERILE: Brand: MEDLINE

## (undated) DEVICE — SYRINGE MED 20ML STD CLR PLAS LUERLOCK TIP N CTRL DISP

## (undated) DEVICE — BLADE,STAINLESS-STEEL,10,STRL,DISPOSABLE: Brand: MEDLINE

## (undated) DEVICE — GOWN,AURORA,BRTHSLV,2XL,18/CS: Brand: MEDLINE

## (undated) DEVICE — PADDING,UNDERCAST,COTTON, 4"X4YD STERILE: Brand: MEDLINE

## (undated) DEVICE — MARKER,SKIN,WI/RULER AND LABELS: Brand: MEDLINE

## (undated) DEVICE — DRESSING HYDROFIBER AQUACEL AG ADVANTAGE 3.5X6 IN

## (undated) DEVICE — GUIDE PIN 3.2MMX330MM

## (undated) DEVICE — GOWN,BREATHABLE SLV,AURORA,XLG,STRL: Brand: MEDLINE

## (undated) DEVICE — GUIDE PIN 3.2MMX381MM

## (undated) DEVICE — TUBING, SUCTION, 1/4" X 10', STRAIGHT: Brand: MEDLINE

## (undated) DEVICE — COVER BOOT L REG BLU SMS POLYPR KNEE HI E FLD RESIST

## (undated) DEVICE — 6617 IOBAN II PATIENT ISOLATION DRAPE 5/BX,4BX/CS: Brand: STERI-DRAPE™ IOBAN™ 2

## (undated) DEVICE — NDL CNTR 40CT FM MAG: Brand: MEDLINE INDUSTRIES, INC.

## (undated) DEVICE — HOOD WITH PEEL AWAY FACE SHIELD: Brand: T7PLUS

## (undated) DEVICE — IMPLANTABLE DEVICE
Type: IMPLANTABLE DEVICE | Site: HIP | Status: NON-FUNCTIONAL
Brand: BALL NOSE GUIDE WIRE, Ø3.0MM X 900MM
Removed: 2024-11-20

## (undated) DEVICE — TUBING SUCT 12FR MAL ALUM SHFT FN CAP VENT UNIV CONN W/ OBT

## (undated) DEVICE — ELECTRODE ES L3IN S STL BLDE INSUL DISP VALLEYLAB EDGE

## (undated) DEVICE — TOWEL,OR,DSP,ST,BLUE,STD,6/PK,12PK/CS: Brand: MEDLINE

## (undated) DEVICE — 4-PORT MANIFOLD: Brand: NEPTUNE 2

## (undated) DEVICE — HEWSON SUTURE RETRIEVER: Brand: HEWSON SUTURE RETRIEVER

## (undated) DEVICE — GOWN,SIRUS,POLYRNF,RAGLAN,XL,ST,30/CS: Brand: MEDLINE